# Patient Record
Sex: MALE | Race: WHITE | NOT HISPANIC OR LATINO | Employment: FULL TIME | URBAN - METROPOLITAN AREA
[De-identification: names, ages, dates, MRNs, and addresses within clinical notes are randomized per-mention and may not be internally consistent; named-entity substitution may affect disease eponyms.]

---

## 2023-05-08 LAB
CHOLESTEROL (MG/DL) IN SER/PLAS: 218 MG/DL (ref 0–199)
CHOLESTEROL IN HDL (MG/DL) IN SER/PLAS: 65.8 MG/DL
CHOLESTEROL/HDL RATIO: 3.3
ESTIMATED AVERAGE GLUCOSE FOR HBA1C: 108 MG/DL
HEMOGLOBIN A1C/HEMOGLOBIN TOTAL IN BLOOD: 5.4 %
LDL: 130 MG/DL (ref 0–99)
THYROTROPIN (MIU/L) IN SER/PLAS BY DETECTION LIMIT <= 0.05 MIU/L: 7.92 MIU/L (ref 0.44–3.98)
THYROXINE (T4) FREE (NG/DL) IN SER/PLAS: 1.25 NG/DL (ref 0.78–1.48)
TRIGLYCERIDE (MG/DL) IN SER/PLAS: 113 MG/DL (ref 0–149)
VLDL: 23 MG/DL (ref 0–40)

## 2023-12-13 DIAGNOSIS — C43.9 MALIGNANT MELANOMA, UNSPECIFIED SITE (MULTI): Primary | ICD-10-CM

## 2023-12-14 DIAGNOSIS — C79.89 SECONDARY MALIGNANT NEOPLASM OF OTHER SPECIFIED SITES (MULTI): Primary | ICD-10-CM

## 2023-12-15 ENCOUNTER — HOSPITAL ENCOUNTER (OUTPATIENT)
Dept: RADIOLOGY | Facility: HOSPITAL | Age: 58
Discharge: HOME | End: 2023-12-15
Payer: COMMERCIAL

## 2023-12-15 ENCOUNTER — APPOINTMENT (OUTPATIENT)
Dept: RADIOLOGY | Facility: HOSPITAL | Age: 58
End: 2023-12-15
Payer: COMMERCIAL

## 2023-12-15 ENCOUNTER — HOSPITAL ENCOUNTER (OUTPATIENT)
Dept: RADIOLOGY | Facility: HOSPITAL | Age: 58
End: 2023-12-15
Payer: COMMERCIAL

## 2023-12-15 DIAGNOSIS — C43.9 MALIGNANT MELANOMA, UNSPECIFIED SITE (MULTI): ICD-10-CM

## 2023-12-15 DIAGNOSIS — C79.89 SECONDARY MALIGNANT NEOPLASM OF OTHER SPECIFIED SITES (MULTI): ICD-10-CM

## 2023-12-15 PROCEDURE — 70491 CT SOFT TISSUE NECK W/DYE: CPT | Performed by: RADIOLOGY

## 2023-12-15 PROCEDURE — 2550000001 HC RX 255 CONTRASTS: Performed by: NURSE PRACTITIONER

## 2023-12-15 PROCEDURE — 71260 CT THORAX DX C+: CPT | Performed by: RADIOLOGY

## 2023-12-15 PROCEDURE — 70491 CT SOFT TISSUE NECK W/DYE: CPT

## 2023-12-15 PROCEDURE — 74177 CT ABD & PELVIS W/CONTRAST: CPT | Performed by: RADIOLOGY

## 2023-12-15 PROCEDURE — 71260 CT THORAX DX C+: CPT

## 2023-12-15 RX ADMIN — IOHEXOL 120 ML: 350 INJECTION, SOLUTION INTRAVENOUS at 11:57

## 2023-12-19 ENCOUNTER — APPOINTMENT (OUTPATIENT)
Dept: HEMATOLOGY/ONCOLOGY | Facility: HOSPITAL | Age: 58
End: 2023-12-19
Payer: COMMERCIAL

## 2023-12-21 ASSESSMENT — DERMATOLOGY QUALITY OF LIFE (QOL) ASSESSMENT
RATE HOW EMOTIONALLY BOTHERED YOU ARE BY YOUR SKIN PROBLEM (FOR EXAMPLE, WORRY, EMBARRASSMENT, FRUSTRATION): 0 - NEVER BOTHERED
RATE HOW BOTHERED YOU ARE BY SYMPTOMS OF YOUR SKIN PROBLEM (EG, ITCHING, STINGING BURNING, HURTING OR SKIN IRRITATION): 1
WHAT SINGLE SKIN CONDITION LISTED BELOW IS THE PATIENT ANSWERING THE QUALITY-OF-LIFE ASSESSMENT QUESTIONS ABOUT: NONE OF THE ABOVE
RATE HOW BOTHERED YOU ARE BY EFFECTS OF YOUR SKIN PROBLEMS ON YOUR ACTIVITIES (EG, GOING OUT, ACCOMPLISHING WHAT YOU WANT, WORK ACTIVITIES OR YOUR RELATIONSHIPS WITH OTHERS): 0 - NEVER BOTHERED
RATE HOW BOTHERED YOU ARE BY EFFECTS OF YOUR SKIN PROBLEMS ON YOUR ACTIVITIES (EG, GOING OUT, ACCOMPLISHING WHAT YOU WANT, WORK ACTIVITIES OR YOUR RELATIONSHIPS WITH OTHERS): 0 - NEVER BOTHERED
RATE HOW BOTHERED YOU ARE BY SYMPTOMS OF YOUR SKIN PROBLEM (EG, ITCHING, STINGING BURNING, HURTING OR SKIN IRRITATION): 1
RATE HOW EMOTIONALLY BOTHERED YOU ARE BY YOUR SKIN PROBLEM (FOR EXAMPLE, WORRY, EMBARRASSMENT, FRUSTRATION): 0 - NEVER BOTHERED
WHAT SINGLE SKIN CONDITION LISTED BELOW IS THE PATIENT ANSWERING THE QUALITY-OF-LIFE ASSESSMENT QUESTIONS ABOUT: NONE OF THE ABOVE

## 2023-12-21 ASSESSMENT — PATIENT GLOBAL ASSESSMENT (PGA): WHAT IS THE PGA: PATIENT GLOBAL ASSESSMENT:  1 - CLEAR

## 2023-12-22 ENCOUNTER — OFFICE VISIT (OUTPATIENT)
Dept: DERMATOLOGY | Facility: CLINIC | Age: 58
End: 2023-12-22
Payer: COMMERCIAL

## 2023-12-22 DIAGNOSIS — D22.9 MULTIPLE BENIGN NEVI: ICD-10-CM

## 2023-12-22 DIAGNOSIS — L90.5 SCAR CONDITIONS AND FIBROSIS OF SKIN: ICD-10-CM

## 2023-12-22 DIAGNOSIS — L81.4 LENTIGO: Primary | ICD-10-CM

## 2023-12-22 DIAGNOSIS — L85.3 XEROSIS CUTIS: ICD-10-CM

## 2023-12-22 DIAGNOSIS — Z85.820 PERSONAL HISTORY OF MALIGNANT MELANOMA: ICD-10-CM

## 2023-12-22 PROCEDURE — 1036F TOBACCO NON-USER: CPT | Performed by: DERMATOLOGY

## 2023-12-22 PROCEDURE — 99213 OFFICE O/P EST LOW 20 MIN: CPT | Performed by: DERMATOLOGY

## 2023-12-22 RX ORDER — LEVOTHYROXINE SODIUM 100 UG/1
100 TABLET ORAL DAILY
COMMUNITY
Start: 2023-02-26 | End: 2024-03-19 | Stop reason: WASHOUT

## 2023-12-22 NOTE — PROGRESS NOTES
Subjective     Errol Das is a 58 y.o. male who presents for the following: Skin Check. No new lesions of concern. Intermittent sunscreen use. Some itchy skin on his legs and abdomen. He had a bcc diagnosed at his last visit, s/p Mohs. 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    Review of Systems:  No other skin or systemic complaints other than what is documented elsewhere in the note.    The following portions of the chart were reviewed this encounter and updated as appropriate:         Skin Cancer History  - 04/2023, right neck, infiltrative and nodular BCC, s/p Mohs with Dr. 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 Year  Location: Right parotid cheek. Treatment(s): pembrolizumab Pathology: [id number]  - reports melanoma in situ, s/p excision with Dr. Poe, 15+ years ago      Specialty Problems    None       Objective   Well appearing patient in no apparent distress; mood and affect are within normal limits.    A full examination was performed including scalp, head, eyes, ears, nose, lips, neck, chest, axillae, abdomen, back, buttocks, bilateral upper extremities, bilateral lower extremities, hands, feet, fingers, toes, fingernails, and toenails. All findings within normal limits unless otherwise noted below.    Assessment/Plan   1. Lentigo  Scattered tan macules in sun-exposed areas.    Lentiginies  - discussed benign nature of lesions, and that none meet the criteria for biopsy today      2. Personal history of malignant melanoma  No cervical, axillary, or inguinal lymphadenopathy.    - discussed ABCDEs of melanoma, and that nonmelanoma skin cancers can present as nonhealing wounds  - discussed sun protection, recommended SPF 30 daily  - continue q6mo skin checks  - parotid melanoma being followed by heme onc    Related Procedures  Follow Up In Dermatology - Established Patient    3. Multiple benign nevi  Multiple brown macules and papules with  regular boarders and pigment network    4. Xerosis cutis  Legs  Fine, ashy, pale to white scale diffusely over skin.    Xerosis  - recommended moisturization BID    5. Scar conditions and fibrosis of skin  Neck - Anterior  Well healed surgical scars with no evidence of recurrence    No evidence of recurrence, continue skin checks.    Seen and discussed with attending physician Dr. Gerson Blackman MD, PhD  Resident, Dermatology    I saw and evaluated the patient. I personally obtained the key and critical portions of the history and physical exam or was physically present for key and critical portions performed by the resident/fellow. I reviewed the resident/fellow's documentation and discussed the patient with the resident/fellow. I agree with the resident/fellow's medical decision making as documented in the note.    Justino Nieto MD PhD

## 2023-12-26 ENCOUNTER — OFFICE VISIT (OUTPATIENT)
Dept: HEMATOLOGY/ONCOLOGY | Facility: HOSPITAL | Age: 58
End: 2023-12-26
Payer: COMMERCIAL

## 2023-12-26 ENCOUNTER — LAB (OUTPATIENT)
Dept: LAB | Facility: HOSPITAL | Age: 58
End: 2023-12-26
Payer: COMMERCIAL

## 2023-12-26 VITALS
TEMPERATURE: 97.9 F | OXYGEN SATURATION: 98 % | HEART RATE: 83 BPM | SYSTOLIC BLOOD PRESSURE: 108 MMHG | RESPIRATION RATE: 16 BRPM | BODY MASS INDEX: 25.19 KG/M2 | HEIGHT: 68 IN | DIASTOLIC BLOOD PRESSURE: 67 MMHG | WEIGHT: 166.23 LBS

## 2023-12-26 DIAGNOSIS — C79.89: Primary | ICD-10-CM

## 2023-12-26 DIAGNOSIS — C79.89: ICD-10-CM

## 2023-12-26 DIAGNOSIS — E03.9 HYPOTHYROIDISM, UNSPECIFIED TYPE: ICD-10-CM

## 2023-12-26 DIAGNOSIS — Z92.89 HISTORY OF IMMUNOTHERAPY: ICD-10-CM

## 2023-12-26 DIAGNOSIS — C79.89 SECONDARY MALIGNANT NEOPLASM OF OTHER SPECIFIED SITES (MULTI): ICD-10-CM

## 2023-12-26 PROBLEM — C43.9 MELANOMA OF SKIN (MULTI): Status: ACTIVE | Noted: 2023-12-26

## 2023-12-26 LAB
ALBUMIN SERPL BCP-MCNC: 4.7 G/DL (ref 3.4–5)
ALP SERPL-CCNC: 66 U/L (ref 33–120)
ALT SERPL W P-5'-P-CCNC: 10 U/L (ref 10–52)
ANION GAP SERPL CALC-SCNC: 14 MMOL/L (ref 10–20)
AST SERPL W P-5'-P-CCNC: 20 U/L (ref 9–39)
BASOPHILS # BLD AUTO: 0.1 X10*3/UL (ref 0–0.1)
BASOPHILS NFR BLD AUTO: 1 %
BILIRUB SERPL-MCNC: 0.5 MG/DL (ref 0–1.2)
BUN SERPL-MCNC: 18 MG/DL (ref 6–23)
CALCIUM SERPL-MCNC: 9.7 MG/DL (ref 8.6–10.3)
CHLORIDE SERPL-SCNC: 102 MMOL/L (ref 98–107)
CO2 SERPL-SCNC: 28 MMOL/L (ref 21–32)
CREAT SERPL-MCNC: 0.83 MG/DL (ref 0.5–1.3)
EOSINOPHIL # BLD AUTO: 0.76 X10*3/UL (ref 0–0.7)
EOSINOPHIL NFR BLD AUTO: 8 %
ERYTHROCYTE [DISTWIDTH] IN BLOOD BY AUTOMATED COUNT: 13 % (ref 11.5–14.5)
GFR SERPL CREATININE-BSD FRML MDRD: >90 ML/MIN/1.73M*2
GLUCOSE SERPL-MCNC: 98 MG/DL (ref 74–99)
HCT VFR BLD AUTO: 40.2 % (ref 41–52)
HGB BLD-MCNC: 13.7 G/DL (ref 13.5–17.5)
IMM GRANULOCYTES # BLD AUTO: 0.02 X10*3/UL (ref 0–0.7)
IMM GRANULOCYTES NFR BLD AUTO: 0.2 % (ref 0–0.9)
LDH SERPL L TO P-CCNC: 173 U/L (ref 84–246)
LYMPHOCYTES # BLD AUTO: 1.66 X10*3/UL (ref 1.2–4.8)
LYMPHOCYTES NFR BLD AUTO: 17.4 %
MCH RBC QN AUTO: 32.2 PG (ref 26–34)
MCHC RBC AUTO-ENTMCNC: 34.1 G/DL (ref 32–36)
MCV RBC AUTO: 94 FL (ref 80–100)
MONOCYTES # BLD AUTO: 0.6 X10*3/UL (ref 0.1–1)
MONOCYTES NFR BLD AUTO: 6.3 %
NEUTROPHILS # BLD AUTO: 6.39 X10*3/UL (ref 1.2–7.7)
NEUTROPHILS NFR BLD AUTO: 67.1 %
NRBC BLD-RTO: 0 /100 WBCS (ref 0–0)
PLATELET # BLD AUTO: 276 X10*3/UL (ref 150–450)
POTASSIUM SERPL-SCNC: 4.6 MMOL/L (ref 3.5–5.3)
PROT SERPL-MCNC: 7.6 G/DL (ref 6.4–8.2)
RBC # BLD AUTO: 4.26 X10*6/UL (ref 4.5–5.9)
SODIUM SERPL-SCNC: 139 MMOL/L (ref 136–145)
TSH SERPL-ACNC: 3.71 MIU/L (ref 0.44–3.98)
WBC # BLD AUTO: 9.5 X10*3/UL (ref 4.4–11.3)

## 2023-12-26 PROCEDURE — 99215 OFFICE O/P EST HI 40 MIN: CPT | Performed by: NURSE PRACTITIONER

## 2023-12-26 PROCEDURE — 85025 COMPLETE CBC W/AUTO DIFF WBC: CPT

## 2023-12-26 PROCEDURE — 1036F TOBACCO NON-USER: CPT | Performed by: NURSE PRACTITIONER

## 2023-12-26 PROCEDURE — 84443 ASSAY THYROID STIM HORMONE: CPT

## 2023-12-26 PROCEDURE — 80053 COMPREHEN METABOLIC PANEL: CPT

## 2023-12-26 PROCEDURE — 83615 LACTATE (LD) (LDH) ENZYME: CPT

## 2023-12-26 PROCEDURE — 36415 COLL VENOUS BLD VENIPUNCTURE: CPT

## 2023-12-26 ASSESSMENT — ENCOUNTER SYMPTOMS
HEMATOLOGIC/LYMPHATIC NEGATIVE: 1
ENDOCRINE NEGATIVE: 1
CARDIOVASCULAR NEGATIVE: 1
RESPIRATORY NEGATIVE: 1
GASTROINTESTINAL NEGATIVE: 1
PSYCHIATRIC NEGATIVE: 1
EYES NEGATIVE: 1
CONSTITUTIONAL NEGATIVE: 1
MUSCULOSKELETAL NEGATIVE: 1
NEUROLOGICAL NEGATIVE: 1

## 2023-12-26 ASSESSMENT — PAIN SCALES - GENERAL: PAINLEVEL: 0-NO PAIN

## 2023-12-26 NOTE — PATIENT INSTRUCTIONS
Mr. Errol Das ,  It was a pleasure talking to you today.    As discussed, we will be conducting surveillance scans in 3 months on 03/15/2024. We will meet again after on 03/19/2024. Please keep your appointments with dermatology and surgical oncology.     Please make sure to make your appointments as we discussed. Your appointments should also be visible in your MyChart.     In case of an emergency please dial 911 or report to your nearest Emergency Room.  For all other questions, please do not hesitate to reach out to us at the number listed below.    Thank you for choosing [facility] at [facility] [place].   We appreciate your visit.     MD Alisson Clifford, ARIANE Matthew RN ([facility] location)  Ju Schmidt RN ([place] location)    Sarcoma and Cutaneous Oncology team    Phone: [phone]  Fax: [phone].

## 2023-12-26 NOTE — PROGRESS NOTES
".Patient ID: Errol Das is a 58 y.o. male.  Referring Physician: No referring provider defined for this encounter.  Primary Care Provider: Keith Matos MD MPH     Chief Complaint   Patient presents with    Follow-up      HPI  Mr. Das is referred to our clinic by Dr. 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 He met with our team on 5/31/22.      Mr. Das met with Dr. Bhat on 4/15/22. He was referred by his PCP after the patient told them about a lump in the parotid gland region since beginning of 2022. the lump had grown significantly. He underwent FNAC on 4/28/22 and the path read  melanoma. Then he was taken to OR on 5/23/22 and underwent resection. Pathology report reads 1/16 positive nodes. The lone node has 6.5mm deposit with MELANIE. The parotid mass is necrotic and measures 2.3cm. Negative for BRAF V600.     PET scan (on 7/11/22) and MRI brain (7/14/22) did not show any activity concerning for tumors. We recommend adjuvant pembrolizumab. Patient consented on 7/19/22 and started Keytruda 400mg every 6 weeks on 7/26/22. He completed one year of therapy on 08/01/2023.    Treatment History:    Systemic treatment  1. Pembrolizumab 400mg every 6 weeks- s/p 9 cycles as of 08/01/2023     irAE's  1. Hypothyroidism (currently on Synthroid 112mcg PO daily).     Subjective   Please refer to \"Notes/Cancer History\" above for complete History of present illness.     Mr Errol Das     - Presents to clinic alone.   - Nasal congestion. No fevers, chills, cough or SOB.   - Otherwise feels well.  - Continues skin checks with dermatology.      Review of Systems:   Review of Systems   Constitutional: Negative.    HENT:  Negative.          Nasal congestion    Eyes: Negative.    Respiratory: Negative.     Cardiovascular: Negative.    Gastrointestinal: Negative.    Endocrine: Negative.    Genitourinary: Negative.     Musculoskeletal: Negative.    Skin: Negative.  " "  Neurological: Negative.    Hematological: Negative.    Psychiatric/Behavioral: Negative.           MEDICAL HISTORY  Melanoma, hypothyroidism     FAMILY HISTORY  No family history on file.    TOBACCO HISTORY  Tobacco Use: Low Risk  (12/26/2023)    Patient History     Smoking Tobacco Use: Never     Smokeless Tobacco Use: Never     Passive Exposure: Not on file       SOCIAL HISTORY  Social Connections: Not on file   , lives with his wife. Has a son and daughter. Works full time.      Outpatient Medication Profile:  Current Outpatient Medications on File Prior to Visit   Medication Sig Dispense Refill    levothyroxine (Synthroid, Levoxyl) 100 mcg tablet Take 1 tablet (100 mcg) by mouth once daily.       No current facility-administered medications on file prior to visit.         Performance Status:  Asymptomatic     Vitals and Measurements:   /67 (BP Location: Left arm, Patient Position: Sitting, BP Cuff Size: Adult)   Pulse 83   Temp 36.6 °C (97.9 °F) (Temporal)   Resp 16   Ht (S) 1.716 m (5' 7.56\")   Wt 75.4 kg (166 lb 3.6 oz)   SpO2 98%   BMI 25.61 kg/m²       Physical Exam:   Physical Exam  Constitutional:       Appearance: Normal appearance.   HENT:      Head: Normocephalic and atraumatic.      Nose: Nose normal.      Mouth/Throat:      Mouth: Mucous membranes are moist.      Pharynx: Oropharynx is clear.   Eyes:      Conjunctiva/sclera: Conjunctivae normal.   Neck:      Comments: Healed incisions without nodules   Cardiovascular:      Rate and Rhythm: Normal rate and regular rhythm.      Pulses: Normal pulses.      Heart sounds: Normal heart sounds.   Pulmonary:      Effort: Pulmonary effort is normal.      Breath sounds: Normal breath sounds.   Abdominal:      General: Bowel sounds are normal.      Palpations: Abdomen is soft.   Musculoskeletal:         General: Normal range of motion.      Cervical back: Neck supple.   Skin:     General: Skin is warm and dry.   Neurological:      General: " No focal deficit present.      Mental Status: He is alert and oriented to person, place, and time.   Psychiatric:         Mood and Affect: Mood normal.         Behavior: Behavior normal.        Lab Results:  I have reviewed these laboratory results:     No visits with results within 1 Month(s) from this visit.   Latest known visit with results is:   Legacy Encounter on 09/21/2023   Component Date Value Ref Range Status    Cortisol 09/21/2023 12.8  2.5 - 20.0 ug/dL Final    LD 09/21/2023 144  84 - 246 U/L Final    Glucose 09/21/2023 99  74 - 99 mg/dL Final    Sodium 09/21/2023 138  136 - 145 mmol/L Final    Potassium 09/21/2023 4.5  3.5 - 5.3 mmol/L Final    Chloride 09/21/2023 100  98 - 107 mmol/L Final    Bicarbonate 09/21/2023 29  21 - 32 mmol/L Final    Anion Gap 09/21/2023 14  10 - 20 mmol/L Final    Urea Nitrogen 09/21/2023 15  6 - 23 mg/dL Final    Creatinine 09/21/2023 0.71  0.50 - 1.30 mg/dL Final    GFR MALE 09/21/2023 >90  >90 mL/min/1.73m2 Final    Calcium 09/21/2023 9.5  8.6 - 10.3 mg/dL Final    Albumin 09/21/2023 4.6  3.4 - 5.0 g/dL Final    Alkaline Phosphatase 09/21/2023 64  33 - 120 U/L Final    Total Protein 09/21/2023 7.4  6.4 - 8.2 g/dL Final    AST 09/21/2023 27  9 - 39 U/L Final    Total Bilirubin 09/21/2023 0.4  0.0 - 1.2 mg/dL Final    ALT (SGPT) 09/21/2023 15  10 - 52 U/L Final    WBC 09/21/2023 6.2  4.4 - 11.3 x10E9/L Final    RBC 09/21/2023 4.43 (L)  4.50 - 5.90 x10E12/L Final    Hemoglobin 09/21/2023 14.2  13.5 - 17.5 g/dL Final    Hematocrit 09/21/2023 41.9  41.0 - 52.0 % Final    MCV 09/21/2023 95  80 - 100 fL Final    MCHC 09/21/2023 33.9  32.0 - 36.0 g/dL Final    Platelets 09/21/2023 242  150 - 450 x10E9/L Final    RDW 09/21/2023 13.1  11.5 - 14.5 % Final    Neutrophils % 09/21/2023 57.2  40.0 - 80.0 % Final    Immature Granulocytes %, Automated 09/21/2023 0.3  0.0 - 0.9 % Final    Lymphocytes % 09/21/2023 24.7  13.0 - 44.0 % Final    Monocytes % 09/21/2023 7.0  2.0 - 10.0 % Final     Eosinophils % 09/21/2023 9.3  0.0 - 6.0 % Final    Basophils % 09/21/2023 1.5  0.0 - 2.0 % Final    Neutrophils Absolute 09/21/2023 3.53  1.20 - 7.70 x10E9/L Final    Lymphocytes Absolute 09/21/2023 1.52  1.20 - 4.80 x10E9/L Final    Monocytes Absolute 09/21/2023 0.43  0.10 - 1.00 x10E9/L Final    Eosinophils Absolute 09/21/2023 0.57  0.00 - 0.70 x10E9/L Final    Basophils Absolute 09/21/2023 0.09  0.00 - 0.10 x10E9/L Final    Adrenocorticotropic Hormone (ACTH) 09/21/2023 29.2  7.2 - 63.3 pg/mL Final    TSH 09/21/2023 4.96 (H)  0.44 - 3.98 mIU/L Final    Free T4 09/21/2023 1.07  0.78 - 1.48 ng/dL Final         Radiology Result:  I have reviewed the latest Imaging in PACS and the findings are noted in this note. I discussed the results of the latest imaging with the patient. All previous imaging were reviewed at the time it was completed. Full records are available in the EMR for review as well.     CT chest abdomen pelvis w IV contrast    Result Date: 12/15/2023  Impression: Melanoma restaging scan as compared to prior CT from 09/22/2023:   1. Stable appearance of smooth subcutaneous soft tissue thickening along the right lower abdominal wall. No evidence of new metastatic disease within the chest, abdomen, or pelvis. 2. Stable additional chronic and incidental findings as described above.     I personally reviewed the images/study and I agree with Dr. Urmila Lindsay and the findings as stated.   MACRO: None   Signed by: Naresh Maldonado 12/15/2023 10:32 PM Dictation workstation:   [id number]    CT soft tissue neck w IV contrast    Result Date: 12/15/2023  Impression: No evidence of significant cervical adenopathy or a soft tissue mass in the neck.   Postsurgical changes of the right parotid gland, unchanged in appearance compared to the prior exam 09/22/2023.   Unchanged asymmetric air within the left fossa of Rosenmuller.   MACRO: None   Signed by: Alisson Snowden 12/15/2023 12:25 PM Dictation workstation:    "[id number]        Pathology Results:  I have reviewed the full pathology report recorded in the EMR. The pertinent portions indicating diagnosis are listed here in the note. for details please refer to the full report recorded in the EMR.    Surgical Pathology [Mohan 10 2022 2:15PM] ([id number])    Specimens: RIGHT SUPERFICIAL PAROTIDECTOMY OVER LINE FACIAL SKIN. LONG STITCH  SUPERIOR     Name MATTIE MCCULLOUGH     Accession #: [id number]   Pathologist: TYSON LARKIN MD, Board Certified   Dermatopathologist   Date of Procedure: 5/23/2022   Date Received: 5/23/2022   Date Reported 6/10/2022    Submitting Physician: JORGE POWELL MD   Location: [facility] Other External #     FINAL DIAGNOSIS   A. SPECIMEN DESIGNATED \"RIGHT SUPERFICIAL PAROTIDECTOMY OVER LINE FACIAL   SKIN\":   --METASTATIC MELANOMA INVOLVING AT LEAST ONE  OF SIXTEEN INTRAPAROTID LYMPH NODES WITH EXTRACAPSULAR EXTENSION (1/16), SEE COMMENT   --SKIN WITH AN ATYPICAL JUNCTIONAL MELANOCYTIC PROLIFERATION, LOCALIZED AREA OF DERMAL MELANOSIS AND BIOPSY SITE CHANGES, SEE COMMENT     COMMENT: Sections  show actinically altered skin with increased single mild to moderately enlarged junctional melanocytes with poor circumscription, focal areas of confluence and involvement of superficial follicular epithelium, present at peripheral margins. There is a  localized collection of dermal and subcutaneous melanophages approximately 6.5 mm in depth, involving the superior margin. There is a predominantly necrotic intraparotid mass (2.3 cm) with focal collections of severely atypical melanocytes and extensive  melanosis, present at the superficial margin and extending < 1 mm from the deep and anterior margins. Melanocytes are highlighted with SOX-10 and Melan-A. Intraparotid melanocytes and isolated junctional melanocytes are PRAME positive. (Controlsstain  adequately.)     The skin findings are concerning for regressed invasive melanoma, lentigo maligna type " combined with actinic melanocytosis of sun-damaged skin and reactive melanocytic hyperplasia from the trauma of the previous procedure. The  intraparotid mass may represent multiple matted positive lymph nodes, the exact number of which cannot be determined and is therefore counted as one positive lymph node with extracapsular extension. Results of BRAF testing will be reported separately.     If clinically appropriate, further reexcision of the surgical site is recommended.     : Dr. Jason Mtathew     The gross and/or microscopic findings were reviewed in conjunction with pathology resident, Kassie Panda MD.      Electronically Signed Out By TYSON LARKIN MD, Board Certified Dermatopathologist/SWATI   By the signature on this report, the individual or group listed as making the Final Interpretation/Diagnosis certifies that they have reviewed this case. Diagnostic  interpretation performed at [facility] [address] [facility] [address]      =============================================  Addendum Diagnosis   TEST: BRAF Exon 15 Sequencing   SPECIMEN: FFPE, Mass with anterior, superficial, deep margin, Excision,   [id number] A14   DISEASE  DIAGNOSIS: Metastatic Melanoma   Estimated Tumor Content: 40%   COLLECTION DATE: 05/23/2022   RECEIVED DATE: 06/13/2022   REPORT DATE: 06/17/2022     RESULT: None      DISEASE RELEVANT ALTERATIONS NOT DETECTED:   Negative for BRAF V600 mutations.      Assessment and Plan:   Assessment/Plan   Mr. Errol Das is a 58 y.o. 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 Completed one year of adjuvant Pembrolizumab 08/01/2023.     # Melanoma  - CT 12/15/2023 showing stable changes.   - RTC 03/19/2024.  - Labs and CT scan prior 03/15/2024.  - Continue f/u with dermatology for skin checks. Next visit with Dr. Nieto 06/21/2024.     # Hypothyroidism   - TSH pending today.    - Continue levothyroxine.      # Area of thickening on CT scan  - Visually inspected the area. The CT scan is corresponding to the scar he has in his groin where the fat was taken from for the reconstructive surgery.      DISCLAIMER:   In preparing for this visit and writing this note, I reviewed all the previous electronic medical records (labs, imaging and medical charts) of the patient available in the physician portal. Significant findings which helped in decision making are recorded  in this chart.     The plan was discussed with the patient. We gave him ample opportunities to ask questions. All questions were answered to his satisfaction and he verbalized understanding.      INSTRUCTIONS FOR PATIENT  Mr. Errol Das ,  It was a pleasure talking to you today.    As discussed, we will be conducting surveillance scans in 3 months on 03/15/2024. We will meet again on 03/19/2024. Please keep your appointments with dermatology and surgical oncology.     Please make sure to schedule your appointments as we discussed. Your appointments should also appear in your MyChart.   In case of an emergency please dial 911 or report to your nearest Emergency Room.  For all other questions, please do not hesitate to reach out to us at the number listed below.    Thank you for choosing [facility] at [facility].   We appreciate your visit.     MD Alisson Clifford APRN Taylor Costello, RN ([facility] location)  Ju Schmidt RN ([place] location)    Sarcoma and Cutaneous Oncology team    Phone: [phone]  Fax: [phone].

## 2024-01-22 ENCOUNTER — PATIENT MESSAGE (OUTPATIENT)
Dept: ORTHOPEDIC SURGERY | Facility: CLINIC | Age: 59
End: 2024-01-22
Payer: COMMERCIAL

## 2024-01-23 ENCOUNTER — APPOINTMENT (OUTPATIENT)
Dept: PRIMARY CARE | Facility: HOSPITAL | Age: 59
End: 2024-01-23
Payer: COMMERCIAL

## 2024-01-23 ENCOUNTER — OFFICE VISIT (OUTPATIENT)
Dept: ORTHOPEDIC SURGERY | Facility: HOSPITAL | Age: 59
End: 2024-01-23
Payer: COMMERCIAL

## 2024-01-23 ENCOUNTER — HOSPITAL ENCOUNTER (OUTPATIENT)
Dept: RADIOLOGY | Facility: HOSPITAL | Age: 59
Discharge: HOME | End: 2024-01-23
Payer: COMMERCIAL

## 2024-01-23 DIAGNOSIS — M25.561 RIGHT KNEE PAIN, UNSPECIFIED CHRONICITY: ICD-10-CM

## 2024-01-23 PROCEDURE — 99214 OFFICE O/P EST MOD 30 MIN: CPT

## 2024-01-23 PROCEDURE — 73560 X-RAY EXAM OF KNEE 1 OR 2: CPT | Mod: RIGHT SIDE | Performed by: RADIOLOGY

## 2024-01-23 PROCEDURE — 1036F TOBACCO NON-USER: CPT

## 2024-01-23 PROCEDURE — 73560 X-RAY EXAM OF KNEE 1 OR 2: CPT | Mod: RT

## 2024-01-23 PROCEDURE — 99204 OFFICE O/P NEW MOD 45 MIN: CPT

## 2024-01-23 ASSESSMENT — PAIN - FUNCTIONAL ASSESSMENT: PAIN_FUNCTIONAL_ASSESSMENT: 0-10

## 2024-01-23 ASSESSMENT — PAIN SCALES - GENERAL: PAINLEVEL_OUTOF10: 0 - NO PAIN

## 2024-01-23 NOTE — PROGRESS NOTES
Subjective    Patient ID: Errol Das is a 59 y.o. male.    Chief Complaint: New Patient Visit of the Right Knee     Last Surgery: No surgery found  Last Surgery Date: No surgery found    HPI    Errol Das is a 59 y.o.s male presenting for evaluation of side: right knee pain for the past 1 month. Sx started with running and bending the right knee to stretch  and localized to right  superior knee at insertion to the quad tendon . Described as mild soreness. Since increased with running. States that it does not bother him from day to day, only when stretching and running. Patient has not tried medication not used. has not had similar sx in the past. has not tried therapy for the pain. Denies trauma/fall. States that it could have been caused by a ski trip that he had last month. Denies numbness, tingling, f/c, n/v, CP, SOB, or any other complaints/concerns.      No past medical history on file.    Medication Documentation Review Audit       Reviewed by Olena Ramachandran MA (Medical Assistant) on 01/23/24 at 1158      Medication Order Taking? Sig Documenting Provider Last Dose Status   levothyroxine (Synthroid, Levoxyl) 100 mcg tablet [id number] Yes Take 1 tablet (100 mcg) by mouth once daily. Historical Provider, MD Taking Active                    No Known Allergies    Social History     Socioeconomic History    Marital status:      Spouse name: Not on file    Number of children: Not on file    Years of education: Not on file    Highest education level: Not on file   Occupational History    Not on file   Tobacco Use    Smoking status: Never    Smokeless tobacco: Never   Substance and Sexual Activity    Alcohol use: Never    Drug use: Never    Sexual activity: Not on file   Other Topics Concern    Not on file   Social History Narrative    Not on file     Social Determinants of Health     Financial Resource Strain: Not on file   Food Insecurity: Not on file   Transportation Needs: Not on file    Physical Activity: Not on file   Stress: Not on file   Social Connections: Not on file   Intimate Partner Violence: Not on file   Housing Stability: Not on file       Past Surgical History:   Procedure Laterality Date    OTHER SURGICAL HISTORY  10/28/2013    Corneal LASIK Bilateral    OTHER SURGICAL HISTORY  10/28/2013    Finger Tendon Lengthening    TONSILLECTOMY  10/28/2013    Tonsillectomy        Objective   Review of Systems   30 point ROS reviewed and negative other than as listed in the HPI.      Exam  Gen: The pt is A&Ox3, NAD, and appear state age and weight  Psychiatric: mood and affect are appropriate   Eyes: sclera are white, EOM grossly intact  ENT: MMM  Neck: supple, thyroid is midline  Respiratory: respirations are nonlabored, chest rise symmetric  CV: rate is regular by palpation of distal pulses  Abdomen: nondistended   Integument: no obvious cutaneous lesions noted. No signs of lymphangitis. No signs of systemic edema.     MSK: Right knee  skin intact, no wounds or breakdown noted  No lower leg edema  No TTP joint line   no knee effusion noted  compartments soft  no calf tenderness  sensation intact to light touch  motor intact including TA/GS/EHL  palpable DP/PT pulses 2+  stable to valgus/varus stress exams at 30 degrees of flexion  negative Lachmans and posterior drawer  Negative patellar crepitus  Noticeable bulge around the superior part of the knee where quad tendon attachment occurs  No tenderness to palpation  Was able to perform straight leg raise and had 5/5 quadriceps strength  Knee motion- 0-130    Imaging:  I personally reviewed multiple views of the right knee were obtained in the office today demonstrate a stable knee joint with no arthritis present. Blunting of the quadriceps tendon soft tissue shadow was noted.      Assessment/Plan   Encounter Diagnoses:  Right knee pain, unspecified chronicity    Assessment:  right  knee pain    Plan:  Patient is presenting for new right knee  pain. Patient states that about a month ago after a skiing trip that he started have right superior knee pain. It only occurs when he is flexing the knee and after running. He states that it is mild soreness. He states that he did not have any injury or trauma to the knee and did not hear a pop. Patient has not tried any treatment for the pain. Imaging showed a stable knee joint with no arthritis present. There was blunting of the quadriceps tendon soft tissue shadow noted. Patient had a noticeable bulge around the superior part of the knee where the quadriceps tendon attaches, but is able to straight leg raise and has full quadriceps strength. Given that the patient did not have any injury, is able to do day to day activity, and given he is able to perform straight leg raise, I believe that the patient more likely has a strain of his right quadriceps tendon vs. Partial tear of his quadriceps tendon. I explained to the patient what a strain of the quadriceps tendon is and how it is treated. Patient should begin physical therapy where they will work on ROM and quad strengthening. Patient should ice and take over the counter medication such as ibuprofen and tylenol for pain control and swelling. If the pain persists, worsens, or he starts to lose strength and motion, then he should follow up with our office and we will order an MRI to determine and evaluate the quadriceps tendon and look for a tear. If patient improves with physical therapy then no follow up is needed. Patient is in agreement with this plan.     Orders Placed This Encounter    XR knee right 1-2 views     No follow-ups on file.

## 2024-02-02 ENCOUNTER — EVALUATION (OUTPATIENT)
Dept: PHYSICAL THERAPY | Facility: HOSPITAL | Age: 59
End: 2024-02-02
Payer: COMMERCIAL

## 2024-02-02 DIAGNOSIS — M25.561 CHRONIC PAIN OF RIGHT KNEE: Primary | ICD-10-CM

## 2024-02-02 DIAGNOSIS — G89.29 CHRONIC PAIN OF RIGHT KNEE: Primary | ICD-10-CM

## 2024-02-02 PROCEDURE — 97161 PT EVAL LOW COMPLEX 20 MIN: CPT | Mod: GP

## 2024-02-02 PROCEDURE — 97110 THERAPEUTIC EXERCISES: CPT | Mod: GP

## 2024-02-02 ASSESSMENT — ENCOUNTER SYMPTOMS
OCCASIONAL FEELINGS OF UNSTEADINESS: 0
DEPRESSION: 0
LOSS OF SENSATION IN FEET: 0

## 2024-02-02 NOTE — PROGRESS NOTES
Initial evaluation  Physical Therapy Initial Evaluation    Patient Name:Errol Das  MRN:[id number]  Today's Date:2/6/2024  Referred by: * No referring provider recorded for this case *  Time Calculation  Start Time: 1113  Stop Time: 1157  Time Calculation (min): 44 min    Therapy Diagnosis  1. Chronic pain of right knee       Plan of Care  Planned interventions include prn: Cryotherapy, edema control, electrical stimulation, home exercise program, hot pack, manual therapy, therapeutic exercise, vasopneumatic device with cold  Frequency and duration: 1  time(s) a week, for  4 weeks or 4 visits .   Plan of care was developed with input and agreement by the patient.     Assessment    Problem List: activity limitations, ADLs/IADLs/self care skills, decreased functional level, decreased knowledge of HEP, flexibility, pain, range of motion/joint mobility and strength.     Patient is a 59 y.o. male who presents with complaint of R knee pain of about 6 weeks following ski trip. No previous history of knee pain . Standardized testing and measures administered today reveal that the patient has multiple impairments in body structures and functions, activity limitations, and participation restrictions. These include subjective and objective findings such as pain, tenderness to palpation of the affected area, decreased ROM, strength, flexibility, and function. The patient's impairments are likely influenced by mechanical dysfunction and deconditioning with possible overuse and degenerative changes. Skilled PT services are warranted in order to realize measurable and meaningful change in the above outcome measures and achieve improvements in the patient's functional status and individual goals.  Rehab Potential:good  Clinical Presentation: Stable and/or uncomplicated characteristics.   Evaluation Complexity: Low     Precautions/Fall Risk:none Pacemaker no  Seizures No  Post Op Movement/Restrictions No    Insurance  Visit  number: 1   Approved number of visits: 30  Onset Date: December 2023    Payor: [employer] [facility] / Plan: [employer] / Product Type: *No Product type* /     Subjective  Chief complaint/reason for visit: R knee pain following skiing but denies specific injury about 6 weeks ago. He had an x-ray 1 week ago with normal findings of bony structures but suspect partial quad tear. Denies past injuries.   Mechanism of Injury:  a sports injury skiing  Location of Pain: supralateral patella  Current Pain Level (0-10): 2   High Pain Level (0-10): 2   Low Pain Level (0-10): 0  Pain Quality: aching  Pain Exacerbating Factors: resting, lying down, sitting, standing, walking, kneeling, stairs and squatting  and running  Pain Relieving Factors:  activity modification    Medical Screening: Reviewed medical history form with patient and medical screening assessed.   Red Flags: Do you have any of the following? No  Fever/chills, unexplained weight changes, dizziness/fainting, unexplained change in bowel or bladder functions, unexplained malaise or muscle weakness, night pain/sweats, numbness or tingling  Current Medical Management: Orthopedic  Prior Level of Function (PLOF)  Patient previously independent with all ADLs  Exercise/Physical Activity: runner (has stopped) biking  Functional limitations: athletics, sitting , walking , participation in leisure activities, stairs , and sleeping  Work Status: full time job doing professor at [employer]  Current Status: remain unchanged slight improvement  Patient Awareness: Patient is aware of  his diagnosis and prognosis.   Living Environment: house with stairs  Social Support: spouse / significant other  Personal Factors That May Impact Care: none.  Patient's Goal for Treatment:  relieving pain , increasing strength , increasing mobility , walking with a normal gait , reducing symptoms , returning to work  and resuming athletics/activities .     ObjectiveOther Measures  Lower  "Extremity Funtional Score (LEFS): 76   Knee Objective  Observation/Posture: WNL  Palpation: minimal TTP distal lateral quad  Gait: WNL  R/L hamstring flexibility: 30 degrees bilaterally  R/L girth measurement: NT  R/L knee flexion AROM (degrees): 122 with pain//125  R/L knee extension AROM (degrees): lacking 5//lacking 5  R/L hip flexion AROM (degrees): 105//105  R/L hip extension AROM (degrees): 10//10  R/L knee flexion strength (MMT): 4+/5 //4+/5  R/L knee extension strength (MMT): 5/5 // 5/5    Special Tests: all WNL    Treatment Performed Today: Initial evaluation and patient education regarding diagnosis, prognosis, contributing factors, comorbidities, importance of HEP, role of PT, appropriate shoe wear, role of compression, and activity modification.    -TM walk: 5 minutes progressing to 3.8 mph  - Seated HS s: bilateral 30\"x3  - Prone quad s: R side 30\"x3   - Hip Bridging: 10\" x 20   Therapeutic Exercise   minutes      Response to Treatment: decreased pain, improved joint mobility/ROM, improved strength, improved flexibility, improved tissue mobility, improved posture, improved balance, improved gait and improved knowledge and understanding of condition    Education/Resources provided today: Home Program   [facility]: HS, calf, piriformis, and prone hip flexor stretching program  Lower Extremity Goals  Lower Extremity Goals: By discharge, patient will:  1) Demonstrate independence with home exercise program for overall symptom management  2) Increase overall exercise tolerance without adverse reaction or increased chief complaint for return to running.  3) Increase strength of R LE (all planes) to WNL in order to improve the ability to perform essential ADLs and return to PLOA.  4) Report decrease in pain by >= 2 points to meet MCID  5) Increase score of LEFS by > 9 points to meet the MCID    Patient stated goal: eliminate knee pain and return to running    "

## 2024-02-05 ENCOUNTER — APPOINTMENT (OUTPATIENT)
Dept: PHYSICAL THERAPY | Facility: HOSPITAL | Age: 59
End: 2024-02-05
Payer: COMMERCIAL

## 2024-02-12 ENCOUNTER — APPOINTMENT (OUTPATIENT)
Dept: PHYSICAL THERAPY | Facility: HOSPITAL | Age: 59
End: 2024-02-12
Payer: COMMERCIAL

## 2024-02-19 ENCOUNTER — APPOINTMENT (OUTPATIENT)
Dept: PHYSICAL THERAPY | Facility: HOSPITAL | Age: 59
End: 2024-02-19
Payer: COMMERCIAL

## 2024-03-04 ENCOUNTER — OFFICE VISIT (OUTPATIENT)
Dept: ORTHOPEDIC SURGERY | Facility: HOSPITAL | Age: 59
End: 2024-03-04
Payer: COMMERCIAL

## 2024-03-04 VITALS — WEIGHT: 165 LBS | BODY MASS INDEX: 25.01 KG/M2 | HEIGHT: 68 IN

## 2024-03-04 DIAGNOSIS — M25.461 EFFUSION OF RIGHT KNEE: Primary | ICD-10-CM

## 2024-03-04 DIAGNOSIS — M25.561 RIGHT KNEE PAIN, UNSPECIFIED CHRONICITY: ICD-10-CM

## 2024-03-04 DIAGNOSIS — M17.11 ARTHRITIS OF RIGHT KNEE: ICD-10-CM

## 2024-03-04 PROCEDURE — 99214 OFFICE O/P EST MOD 30 MIN: CPT | Performed by: FAMILY MEDICINE

## 2024-03-04 PROCEDURE — 99204 OFFICE O/P NEW MOD 45 MIN: CPT | Performed by: FAMILY MEDICINE

## 2024-03-04 PROCEDURE — 1036F TOBACCO NON-USER: CPT | Performed by: FAMILY MEDICINE

## 2024-03-04 ASSESSMENT — PAIN - FUNCTIONAL ASSESSMENT: PAIN_FUNCTIONAL_ASSESSMENT: 0-10

## 2024-03-04 ASSESSMENT — PAIN DESCRIPTION - DESCRIPTORS: DESCRIPTORS: DULL;ACHING

## 2024-03-04 ASSESSMENT — PAIN SCALES - GENERAL: PAINLEVEL_OUTOF10: 2

## 2024-03-04 NOTE — PROGRESS NOTES
** Please excuse any errors in grammar or translation related to this dictation. Voice recognition software was utilized to prepare this document. **    Assessment & Plan:  Clinical presentation most suggestive of flare of underlying right knee arthritis.  No structural instability.  No substantial degenerative changes evident on x-ray however suspect if MRI was obtained would see areas of cartilage loss.  This acute flare is likely causing his effusion.  Patient has not responded to previous conservative measures to include activity modifications and physical therapy.  Discussed management options to include obtaining MRI to assess for internal damage versus scheduling clinic appointment for aspiration and injection of his right knee.  In the meantime patient will continue activities as tolerated.  If he wants to pursue knee injection he will schedule accordingly for this.  All questions answered and patient agrees to this plan of care.    Chief complaint:  Right knee pain    HPI:  59-year-old male presents to the Ortho injury clinic with right knee pain x 2.5 months.  No specific injuries recalled however he does associate onset after going skiing.  Pain is localized to anterior knee.  Pain increases with the use of stairs.  He was previously been on the Ortho provider and referred to physical therapy which he completed and did not resolve his condition.  He reports taking a break from exercise activities to include running for 4-5 weeks.  He has started to exercise again and is able to tolerate for the most part.  Denies sensation of instability.  No locking symptoms.  No previous knee surgery.    Exam:  RIGHT Knee examined.  Effusion present. No ecchymosis, warmth or erythema.  AROM from 0 to 130 deg with 5/5 strength.  Intact extensor mechanism.  SILT overlying knee. Motion crepitus present. Tenderness along medial and lateral joint lines.  No popliteal mass palpated. Negative anterior and posterior drawer.  No  laxity to varus or valgus stress at 0 or 30 deg.  No patellar apprehension.-Amanda    Results:  X-rays of right knee obtained 1/23/2024 reviewed and independent interpreted as no acute fracture with normal alignment.  Suprapatellar effusion present.

## 2024-03-05 ENCOUNTER — DOCUMENTATION (OUTPATIENT)
Dept: PHYSICAL THERAPY | Facility: HOSPITAL | Age: 59
End: 2024-03-05
Payer: COMMERCIAL

## 2024-03-05 NOTE — PROGRESS NOTES
Physical Therapy    Discharge Summary    Name: Errol Das  MRN: [id number]  : 1965  Date: 24    Discharge Summary: PT    Discharge Information: Date of discharge 2024, Date of last visit 2024, Date of evaluation 2024, and Number of attended visits 1    Discharge Status: Discharged     Rehab Discharge Reason: Attendance inconsistent and Failed to schedule and/or keep follow-up appointment(s)

## 2024-03-14 ENCOUNTER — OFFICE VISIT (OUTPATIENT)
Dept: ORTHOPEDIC SURGERY | Facility: HOSPITAL | Age: 59
End: 2024-03-14
Payer: COMMERCIAL

## 2024-03-14 ENCOUNTER — LAB (OUTPATIENT)
Dept: LAB | Facility: HOSPITAL | Age: 59
End: 2024-03-14
Payer: COMMERCIAL

## 2024-03-14 VITALS — HEIGHT: 68 IN | BODY MASS INDEX: 24.25 KG/M2 | WEIGHT: 160 LBS

## 2024-03-14 DIAGNOSIS — M17.11 ARTHRITIS OF RIGHT KNEE: ICD-10-CM

## 2024-03-14 DIAGNOSIS — M25.461 EFFUSION OF RIGHT KNEE: Primary | ICD-10-CM

## 2024-03-14 DIAGNOSIS — C79.89: ICD-10-CM

## 2024-03-14 DIAGNOSIS — C79.89 SECONDARY MALIGNANT NEOPLASM OF OTHER SPECIFIED SITES (MULTI): Primary | ICD-10-CM

## 2024-03-14 DIAGNOSIS — Z92.89 HISTORY OF IMMUNOTHERAPY: ICD-10-CM

## 2024-03-14 DIAGNOSIS — E03.9 HYPOTHYROIDISM, UNSPECIFIED TYPE: ICD-10-CM

## 2024-03-14 LAB
ALBUMIN SERPL BCP-MCNC: 4.5 G/DL (ref 3.4–5)
ALP SERPL-CCNC: 67 U/L (ref 33–120)
ALT SERPL W P-5'-P-CCNC: 8 U/L (ref 10–52)
ANION GAP SERPL CALC-SCNC: 14 MMOL/L (ref 10–20)
AST SERPL W P-5'-P-CCNC: 14 U/L (ref 9–39)
BASOPHILS # BLD AUTO: 0.08 X10*3/UL (ref 0–0.1)
BASOPHILS NFR BLD AUTO: 1 %
BASOPHILS NFR FLD MANUAL: 0 %
BILIRUB SERPL-MCNC: 0.4 MG/DL (ref 0–1.2)
BLASTS NFR FLD MANUAL: 0 %
BUN SERPL-MCNC: 14 MG/DL (ref 6–23)
CALCIUM SERPL-MCNC: 9.5 MG/DL (ref 8.6–10.3)
CCP IGG SERPL-ACNC: <1 U/ML
CHLORIDE SERPL-SCNC: 101 MMOL/L (ref 98–107)
CLARITY FLD: ABNORMAL
CO2 SERPL-SCNC: 28 MMOL/L (ref 21–32)
COLOR FLD: YELLOW
CREAT SERPL-MCNC: 0.69 MG/DL (ref 0.5–1.3)
CRP SERPL-MCNC: 1.47 MG/DL
CRYSTALS FLD MICRO: NORMAL
DSDNA AB SER-ACNC: 1 IU/ML
EGFRCR SERPLBLD CKD-EPI 2021: >90 ML/MIN/1.73M*2
EOSINOPHIL # BLD AUTO: 1.28 X10*3/UL (ref 0–0.7)
EOSINOPHIL NFR BLD AUTO: 15.8 %
EOSINOPHIL NFR FLD MANUAL: 0 %
ERYTHROCYTE [DISTWIDTH] IN BLOOD BY AUTOMATED COUNT: 12.6 % (ref 11.5–14.5)
ERYTHROCYTE [SEDIMENTATION RATE] IN BLOOD BY WESTERGREN METHOD: 27 MM/H (ref 0–20)
GLUCOSE SERPL-MCNC: 95 MG/DL (ref 74–99)
HCT VFR BLD AUTO: 40.9 % (ref 41–52)
HGB BLD-MCNC: 13.7 G/DL (ref 13.5–17.5)
IMM GRANULOCYTES # BLD AUTO: 0.02 X10*3/UL (ref 0–0.7)
IMM GRANULOCYTES NFR BLD AUTO: 0.2 % (ref 0–0.9)
IMMATURE GRANULOCYTES IN FLUID: 0 %
LDH SERPL L TO P-CCNC: 138 U/L (ref 84–246)
LYMPHOCYTES # BLD AUTO: 1.56 X10*3/UL (ref 1.2–4.8)
LYMPHOCYTES NFR BLD AUTO: 19.3 %
LYMPHOCYTES NFR FLD MANUAL: 2 %
MCH RBC QN AUTO: 30.5 PG (ref 26–34)
MCHC RBC AUTO-ENTMCNC: 33.5 G/DL (ref 32–36)
MCV RBC AUTO: 91 FL (ref 80–100)
MONOCYTES # BLD AUTO: 0.5 X10*3/UL (ref 0.1–1)
MONOCYTES NFR BLD AUTO: 6.2 %
MONOS+MACROS NFR FLD MANUAL: 9 %
NEUTROPHILS # BLD AUTO: 4.64 X10*3/UL (ref 1.2–7.7)
NEUTROPHILS NFR BLD AUTO: 57.5 %
NEUTROPHILS NFR FLD MANUAL: 89 %
NRBC BLD-RTO: 0 /100 WBCS (ref 0–0)
OTHER CELLS NFR FLD MANUAL: 0 %
PLASMA CELLS NFR FLD MANUAL: 0 %
PLATELET # BLD AUTO: 358 X10*3/UL (ref 150–450)
POTASSIUM SERPL-SCNC: 4.2 MMOL/L (ref 3.5–5.3)
PROT SERPL-MCNC: 8 G/DL (ref 6.4–8.2)
RBC # BLD AUTO: 4.49 X10*6/UL (ref 4.5–5.9)
RBC # FLD AUTO: 4000 /UL
RHEUMATOID FACT SER NEPH-ACNC: <10 IU/ML (ref 0–15)
SODIUM SERPL-SCNC: 139 MMOL/L (ref 136–145)
TOTAL CELLS COUNTED SNV: 100
TSH SERPL-ACNC: 2.49 MIU/L (ref 0.44–3.98)
URATE SERPL-MCNC: 4.2 MG/DL (ref 4–7.5)
WBC # BLD AUTO: 8.1 X10*3/UL (ref 4.4–11.3)
WBC # FLD AUTO: ABNORMAL /UL

## 2024-03-14 PROCEDURE — 89051 BODY FLUID CELL COUNT: CPT | Performed by: FAMILY MEDICINE

## 2024-03-14 PROCEDURE — 2500000004 HC RX 250 GENERAL PHARMACY W/ HCPCS (ALT 636 FOR OP/ED): Performed by: FAMILY MEDICINE

## 2024-03-14 PROCEDURE — 86225 DNA ANTIBODY NATIVE: CPT | Performed by: FAMILY MEDICINE

## 2024-03-14 PROCEDURE — 86200 CCP ANTIBODY: CPT | Performed by: FAMILY MEDICINE

## 2024-03-14 PROCEDURE — 89060 EXAM SYNOVIAL FLUID CRYSTALS: CPT | Mod: AHULAB | Performed by: FAMILY MEDICINE

## 2024-03-14 PROCEDURE — 84443 ASSAY THYROID STIM HORMONE: CPT

## 2024-03-14 PROCEDURE — 83615 LACTATE (LD) (LDH) ENZYME: CPT

## 2024-03-14 PROCEDURE — 36415 COLL VENOUS BLD VENIPUNCTURE: CPT

## 2024-03-14 PROCEDURE — 86140 C-REACTIVE PROTEIN: CPT | Performed by: FAMILY MEDICINE

## 2024-03-14 PROCEDURE — 1036F TOBACCO NON-USER: CPT | Performed by: FAMILY MEDICINE

## 2024-03-14 PROCEDURE — 80053 COMPREHEN METABOLIC PANEL: CPT

## 2024-03-14 PROCEDURE — 99214 OFFICE O/P EST MOD 30 MIN: CPT | Performed by: FAMILY MEDICINE

## 2024-03-14 PROCEDURE — 86431 RHEUMATOID FACTOR QUANT: CPT | Performed by: FAMILY MEDICINE

## 2024-03-14 PROCEDURE — 87070 CULTURE OTHR SPECIMN AEROBIC: CPT | Mod: AHULAB | Performed by: FAMILY MEDICINE

## 2024-03-14 PROCEDURE — 2500000005 HC RX 250 GENERAL PHARMACY W/O HCPCS: Performed by: FAMILY MEDICINE

## 2024-03-14 PROCEDURE — 20611 DRAIN/INJ JOINT/BURSA W/US: CPT | Performed by: FAMILY MEDICINE

## 2024-03-14 PROCEDURE — 85025 COMPLETE CBC W/AUTO DIFF WBC: CPT

## 2024-03-14 PROCEDURE — 85652 RBC SED RATE AUTOMATED: CPT | Performed by: FAMILY MEDICINE

## 2024-03-14 PROCEDURE — 84550 ASSAY OF BLOOD/URIC ACID: CPT | Performed by: FAMILY MEDICINE

## 2024-03-14 RX ORDER — TRIAMCINOLONE ACETONIDE 40 MG/ML
40 INJECTION, SUSPENSION INTRA-ARTICULAR; INTRAMUSCULAR
Status: COMPLETED | OUTPATIENT
Start: 2024-03-14 | End: 2024-03-14

## 2024-03-14 RX ORDER — LIDOCAINE HYDROCHLORIDE 10 MG/ML
4 INJECTION INFILTRATION; PERINEURAL
Status: COMPLETED | OUTPATIENT
Start: 2024-03-14 | End: 2024-03-14

## 2024-03-14 RX ADMIN — LIDOCAINE HYDROCHLORIDE 4 ML: 10 INJECTION, SOLUTION INFILTRATION; PERINEURAL at 10:13

## 2024-03-14 RX ADMIN — TRIAMCINOLONE ACETONIDE 40 MG: 400 INJECTION, SUSPENSION INTRA-ARTICULAR; INTRAMUSCULAR at 10:13

## 2024-03-14 ASSESSMENT — PAIN SCALES - GENERAL: PAINLEVEL_OUTOF10: 2

## 2024-03-14 ASSESSMENT — PAIN DESCRIPTION - DESCRIPTORS: DESCRIPTORS: ACHING

## 2024-03-14 ASSESSMENT — PAIN - FUNCTIONAL ASSESSMENT: PAIN_FUNCTIONAL_ASSESSMENT: 0-10

## 2024-03-14 NOTE — PROGRESS NOTES
** Please excuse any errors in grammar or translation related to this dictation. Voice recognition software was utilized to prepare this document. **    Assessment & Plan:  Based on appearance of knee aspirate and given atraumatic effusion, concern for inflammatory arthritis.  Knee fluid aspirate sent for crystal analysis and culture.  Will obtain arthritis panel to screen for rheumatological conditions and uric acid levels.  Will notify patient of results.  If lab results indicate inflammatory process will recommend patient be evaluated by rheumatology.  If negative and pain persisting consider MRI to assess for underlying cartilage injury.  Informed patient that the effect of the steroid medication should take 3 to 5 days to receive benefit.  Return precautions given.    Chief complaint:  Right knee swelling    HPI:  3/14/2024: Following up for atraumatic right knee pain.  Reports that had increase in pain about a week ago that lasted for couple days.  No new injury reported.  Denies history of gout or pseudogout.  Denies family history of rheumatological disorders.    3/4/24: 59-year-old male presents to the Ortho injury clinic with right knee pain x 2.5 months.  No specific injuries recalled however he does associate onset after going skiing.  Pain is localized to anterior knee.  Pain increases with the use of stairs.  He was previously been on the Ortho provider and referred to physical therapy which he completed and did not resolve his condition.  He reports taking a break from exercise activities to include running for 4-5 weeks.  He has started to exercise again and is able to tolerate for the most part.  Denies sensation of instability.  No locking symptoms.  No previous knee surgery.       Exam:  RIGHT Knee examined.  Large effusion present. No ecchymosis, warmth or erythema.  AROM from 0 to 130 deg with 5/5 strength. Intact extensor mechanism.  SILT overlying knee. Motion crepitus present. Tenderness  along medial and lateral joint lines.  No popliteal mass palpated. Negative anterior and posterior drawer.  No laxity to varus or valgus stress at 0 or 30 deg.  No patellar apprehension.-Amanda    Results:  X-rays of right knee obtained 1/23/2024: no acute fracture with normal alignment. Suprapatellar effusion presen     Procedure:  Patient ID: Errol Das is a 59 y.o. male.    L Inj/Asp: R knee on 3/14/2024 10:13 AM  Indications: pain  Details: 18 G needle, ultrasound-guided superolateral approach  Medications: 40 mg triamcinolone acetonide 40 mg/mL; 4 mL lidocaine 10 mg/mL (1 %)  Aspirate: 77 mL yellow and cloudy; sent for lab analysis  Outcome: tolerated well, no immediate complications    Procedure risk factors to include increased pain, bleeding, infection, neurovascular injury, soft tissue injury, transient elevation of blood glucose and blood pressure, and adverse reaction to medication were discussed with the patient. Patient understands there is a moderate risk of morbidity from undergoing the procedure.  Procedure, treatment alternatives, risks and benefits explained, specific risks discussed. Consent was given by the patient. Immediately prior to procedure a time out was called to verify the correct patient, procedure, equipment, support staff and site/side marked as required. Patient was prepped and draped in the usual sterile fashion.

## 2024-03-15 ENCOUNTER — HOSPITAL ENCOUNTER (OUTPATIENT)
Dept: RADIOLOGY | Facility: HOSPITAL | Age: 59
Discharge: HOME | End: 2024-03-15
Payer: COMMERCIAL

## 2024-03-15 DIAGNOSIS — C79.89: ICD-10-CM

## 2024-03-15 PROCEDURE — 74177 CT ABD & PELVIS W/CONTRAST: CPT | Performed by: RADIOLOGY

## 2024-03-15 PROCEDURE — 70491 CT SOFT TISSUE NECK W/DYE: CPT | Performed by: RADIOLOGY

## 2024-03-15 PROCEDURE — 74177 CT ABD & PELVIS W/CONTRAST: CPT

## 2024-03-15 PROCEDURE — 2550000001 HC RX 255 CONTRASTS: Performed by: NURSE PRACTITIONER

## 2024-03-15 PROCEDURE — 71260 CT THORAX DX C+: CPT | Performed by: RADIOLOGY

## 2024-03-15 PROCEDURE — 70491 CT SOFT TISSUE NECK W/DYE: CPT

## 2024-03-15 RX ADMIN — IOHEXOL 120 ML: 350 INJECTION, SOLUTION INTRAVENOUS at 12:19

## 2024-03-17 LAB
BACTERIA FLD CULT: NORMAL
GRAM STN SPEC: NORMAL
GRAM STN SPEC: NORMAL

## 2024-03-18 DIAGNOSIS — M19.90 INFLAMMATORY ARTHRITIS: Primary | ICD-10-CM

## 2024-03-19 ENCOUNTER — APPOINTMENT (OUTPATIENT)
Dept: HEMATOLOGY/ONCOLOGY | Facility: HOSPITAL | Age: 59
End: 2024-03-19
Payer: COMMERCIAL

## 2024-03-19 ENCOUNTER — OFFICE VISIT (OUTPATIENT)
Dept: HEMATOLOGY/ONCOLOGY | Facility: HOSPITAL | Age: 59
End: 2024-03-19
Payer: COMMERCIAL

## 2024-03-19 VITALS
OXYGEN SATURATION: 95 % | WEIGHT: 171.3 LBS | RESPIRATION RATE: 18 BRPM | DIASTOLIC BLOOD PRESSURE: 75 MMHG | BODY MASS INDEX: 26.89 KG/M2 | TEMPERATURE: 98.1 F | HEIGHT: 67 IN | SYSTOLIC BLOOD PRESSURE: 130 MMHG | HEART RATE: 61 BPM

## 2024-03-19 DIAGNOSIS — C79.89: ICD-10-CM

## 2024-03-19 DIAGNOSIS — E03.9 HYPOTHYROIDISM, UNSPECIFIED TYPE: ICD-10-CM

## 2024-03-19 DIAGNOSIS — Z92.89 HISTORY OF IMMUNOTHERAPY: ICD-10-CM

## 2024-03-19 DIAGNOSIS — C79.89 METASTATIC MELANOMA TO PAROTID GLAND (MULTI): Primary | ICD-10-CM

## 2024-03-19 PROCEDURE — 1036F TOBACCO NON-USER: CPT | Performed by: NURSE PRACTITIONER

## 2024-03-19 PROCEDURE — 99215 OFFICE O/P EST HI 40 MIN: CPT | Performed by: NURSE PRACTITIONER

## 2024-03-19 RX ORDER — LEVOTHYROXINE SODIUM 112 UG/1
112 TABLET ORAL
Qty: 30 TABLET | Refills: 11 | Status: SHIPPED | OUTPATIENT
Start: 2024-03-19 | End: 2025-03-19

## 2024-03-19 ASSESSMENT — ENCOUNTER SYMPTOMS
ENDOCRINE NEGATIVE: 1
NEUROLOGICAL NEGATIVE: 1
PSYCHIATRIC NEGATIVE: 1
HEMATOLOGIC/LYMPHATIC NEGATIVE: 1
CARDIOVASCULAR NEGATIVE: 1
EYES NEGATIVE: 1
RESPIRATORY NEGATIVE: 1
CONSTITUTIONAL NEGATIVE: 1
MUSCULOSKELETAL NEGATIVE: 1
GASTROINTESTINAL NEGATIVE: 1

## 2024-03-19 ASSESSMENT — PAIN SCALES - GENERAL: PAINLEVEL: 0-NO PAIN

## 2024-03-19 NOTE — PROGRESS NOTES
".Patient ID: Errol Das is a 59 y.o. male.  Referring Physician: Alisson Fishman, APRN-CNP  [id number] [place] [address]  Primary Care Provider: Keith Matos MD MPH     Oncology follow up     HPI  Mr. Das is referred to our clinic by Dr. 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 He met with our team on 5/31/22.      Mr. Das met with Dr. Bhat on 4/15/22. He was referred by his PCP after the patient told them about a lump in the parotid gland region since beginning of 2022. the lump had grown significantly. He underwent FNAC on 4/28/22 and the path read  melanoma. Then he was taken to OR on 5/23/22 and underwent resection. Pathology report reads 1/16 positive nodes. The lone node has 6.5mm deposit with MELANIE. The parotid mass is necrotic and measures 2.3cm. Negative for BRAF V600.     PET scan (on 7/11/22) and MRI brain (7/14/22) did not show any activity concerning for tumors. We recommend adjuvant pembrolizumab. Patient consented on 7/19/22 and started Keytruda 400mg every 6 weeks on 7/26/22. He completed one year of therapy on 08/01/2023 and is currently on surveillance.     Treatment History:    Systemic treatment  1. Pembrolizumab 400mg every 6 weeks- s/p 9 cycles as of 08/01/2023     irAE's  1. Hypothyroidism (currently on Synthroid 112mcg PO daily).     Subjective   Please refer to \"Notes/Cancer History\" above for complete History of present illness.     Mr Errol Das     - Presents to clinic alone.   - Feels well overall.   - Met with ortho in March for right knee swelling. Swelling resolved after aspiration. Concern for inflammatory arthritis.  He is meeting with rheumatology in April.   - Continues skin checks with dermatology.      Review of Systems:   Review of Systems   Constitutional: Negative.    HENT:  Negative.     Eyes: Negative.    Respiratory: Negative.     Cardiovascular: Negative.    Gastrointestinal: Negative.  " "  Endocrine: Negative.    Genitourinary: Negative.     Musculoskeletal: Negative.         ?inflammatory arthritis. No joint pain at this time. Right knee swelling resolved.   Skin: Negative.    Neurological: Negative.    Hematological: Negative.    Psychiatric/Behavioral: Negative.           MEDICAL HISTORY  Melanoma, hypothyroidism     FAMILY HISTORY  No family history on file.    TOBACCO HISTORY  Tobacco Use: Low Risk  (3/19/2024)    Patient History     Smoking Tobacco Use: Never     Smokeless Tobacco Use: Never     Passive Exposure: Not on file       SOCIAL HISTORY  Social Connections: Not on file   , lives with his wife. Has a son and daughter. Works full time as a professor at [employer].       Outpatient Medication Profile:  Current Outpatient Medications on File Prior to Visit   Medication Sig Dispense Refill    levothyroxine (Synthroid, Levoxyl) 100 mcg tablet Take 1 tablet (100 mcg) by mouth once daily.       No current facility-administered medications on file prior to visit.         Performance Status:  Asymptomatic     Vitals and Measurements:   /75 (BP Location: Left arm, Patient Position: Sitting, BP Cuff Size: Adult)   Pulse 61   Temp 36.7 °C (98.1 °F) (Temporal)   Resp 18   Ht (S) 1.709 m (5' 7.28\")   Wt 77.7 kg (171 lb 4.8 oz)   SpO2 95%   BMI 26.60 kg/m²       Physical Exam:   Physical Exam  Constitutional:       Appearance: Normal appearance.   HENT:      Head: Normocephalic and atraumatic.      Nose: Nose normal.      Mouth/Throat:      Mouth: Mucous membranes are moist.      Pharynx: Oropharynx is clear.   Eyes:      Conjunctiva/sclera: Conjunctivae normal.   Neck:      Comments: Healed incisions without nodules   Cardiovascular:      Rate and Rhythm: Normal rate and regular rhythm.      Pulses: Normal pulses.      Heart sounds: Normal heart sounds.   Pulmonary:      Effort: Pulmonary effort is normal.      Breath sounds: Normal breath sounds.   Abdominal:      General: Bowel " sounds are normal.      Palpations: Abdomen is soft.   Musculoskeletal:         General: Normal range of motion.      Cervical back: Neck supple.   Skin:     General: Skin is warm and dry.   Neurological:      General: No focal deficit present.      Mental Status: He is alert and oriented to person, place, and time.   Psychiatric:         Mood and Affect: Mood normal.         Behavior: Behavior normal.      Lab Results:  I have reviewed these laboratory results:     Lab on 03/14/2024   Component Date Value Ref Range Status    WBC 03/14/2024 8.1  4.4 - 11.3 x10*3/uL Final    nRBC 03/14/2024 0.0  0.0 - 0.0 /100 WBCs Final    RBC 03/14/2024 4.49 (L)  4.50 - 5.90 x10*6/uL Final    Hemoglobin 03/14/2024 13.7  13.5 - 17.5 g/dL Final    Hematocrit 03/14/2024 40.9 (L)  41.0 - 52.0 % Final    MCV 03/14/2024 91  80 - 100 fL Final    MCH 03/14/2024 30.5  26.0 - 34.0 pg Final    MCHC 03/14/2024 33.5  32.0 - 36.0 g/dL Final    RDW 03/14/2024 12.6  11.5 - 14.5 % Final    Platelets 03/14/2024 358  150 - 450 x10*3/uL Final    Neutrophils % 03/14/2024 57.5  40.0 - 80.0 % Final    Immature Granulocytes %, Automated 03/14/2024 0.2  0.0 - 0.9 % Final    Lymphocytes % 03/14/2024 19.3  13.0 - 44.0 % Final    Monocytes % 03/14/2024 6.2  2.0 - 10.0 % Final    Eosinophils % 03/14/2024 15.8  0.0 - 6.0 % Final    Basophils % 03/14/2024 1.0  0.0 - 2.0 % Final    Neutrophils Absolute 03/14/2024 4.64  1.20 - 7.70 x10*3/uL Final    Immature Granulocytes Absolute, Au* 03/14/2024 0.02  0.00 - 0.70 x10*3/uL Final    Lymphocytes Absolute 03/14/2024 1.56  1.20 - 4.80 x10*3/uL Final    Monocytes Absolute 03/14/2024 0.50  0.10 - 1.00 x10*3/uL Final    Eosinophils Absolute 03/14/2024 1.28 (H)  0.00 - 0.70 x10*3/uL Final    Basophils Absolute 03/14/2024 0.08  0.00 - 0.10 x10*3/uL Final    Glucose 03/14/2024 95  74 - 99 mg/dL Final    Sodium 03/14/2024 139  136 - 145 mmol/L Final    Potassium 03/14/2024 4.2  3.5 - 5.3 mmol/L Final    Chloride 03/14/2024  101  98 - 107 mmol/L Final    Bicarbonate 03/14/2024 28  21 - 32 mmol/L Final    Anion Gap 03/14/2024 14  10 - 20 mmol/L Final    Urea Nitrogen 03/14/2024 14  6 - 23 mg/dL Final    Creatinine 03/14/2024 0.69  0.50 - 1.30 mg/dL Final    eGFR 03/14/2024 >90  >60 mL/min/1.73m*2 Final    Calcium 03/14/2024 9.5  8.6 - 10.3 mg/dL Final    Albumin 03/14/2024 4.5  3.4 - 5.0 g/dL Final    Alkaline Phosphatase 03/14/2024 67  33 - 120 U/L Final    Total Protein 03/14/2024 8.0  6.4 - 8.2 g/dL Final    AST 03/14/2024 14  9 - 39 U/L Final    Bilirubin, Total 03/14/2024 0.4  0.0 - 1.2 mg/dL Final    ALT 03/14/2024 8 (L)  10 - 52 U/L Final    LDH 03/14/2024 138  84 - 246 U/L Final    Thyroid Stimulating Hormone 03/14/2024 2.49  0.44 - 3.98 mIU/L Final   Office Visit on 03/14/2024   Component Date Value Ref Range Status    Sterile Fluid Culture/Smear 03/14/2024 No growth aerobically and anaerobically   Final    Gram Stain 03/14/2024 (4+) Abundant Polymorphonuclear leukocytes   Final    Gram Stain 03/14/2024 No organisms seen   Final    Color, Fluid 03/14/2024 Yellow  Colorless, Straw, Yellow Final    Clarity, Fluid 03/14/2024 Cloudy (A)  Clear Final    WBC, Fluid 03/14/2024 15,057  See Comment /uL Final    RBC, Fluid 03/14/2024 4,000  0  /uL /uL Final    Neutrophils %, Manual, Fluid 03/14/2024 89  <25 % % Final    Lymphocytes %, Manual, Fluid 03/14/2024 2  <75 % % Final    Mono/Macrophages %, Manual, Fluid 03/14/2024 9  <70 % % Final    Eosinophils %, Manual, Fluid 03/14/2024 0  0 % % Final    Basophils %, Manual, Fluid 03/14/2024 0  0 % % Final    Immature Granulocytes %, Manual, F* 03/14/2024 0  0 % % Final    Blasts %, Manual, Fluid 03/14/2024 0  0 % % Final    Unclassified Cells %, Manual, Fluid 03/14/2024 0  0 % % Final    Plasma Cells %, Manual, Fluid 03/14/2024 0  0 % % Final    Total Cells Counted, Fluid 03/14/2024 100   Final    Uric Acid 03/14/2024 4.2  4.0 - 7.5 mg/dL Final    Sedimentation Rate 03/14/2024 27 (H)  0 - 20  "mm/h Final    Rheumatoid Factor 03/14/2024 <10  0 - 15 IU/mL Final    Citrulline Antibody, IgG 03/14/2024 <1  <3 U/mL Final    C-Reactive Protein 03/14/2024 1.47 (H)  <1.00 mg/dL Final    Anti-DNA (DS) 03/14/2024 1.0  <5.0 IU/mL Final    Crystal Identification, Synovial F* 03/14/2024 No crystals seen by bright-field or first order red axis polarization microscopy.  No crystals seen by bright-field or first order red axis polarization microscopy. Final         Radiology Result:  I have reviewed the latest Imaging in PACS and the findings are noted in this note. I discussed the results of the latest imaging with the patient. All previous imaging were reviewed at the time it was completed. Full records are available in the EMR for review as well.     CT SOFT TISSUE NECK W IV CONTRAST; 3/15/2024 12:20 pm     IMPRESSION:  Stable postsurgical changes of right parotid gland resection with no  evidence of significant cervical adenopathy or a soft tissue mass in  the neck.    =========================================================  CT CHEST ABDOMEN PELVIS W IV CONTRAST; 3/15/2024 12:18 pm     IMPRESSION:  1. Stable findings without evidence of disease     Pathology Results:  I have reviewed the full pathology report recorded in the EMR. The pertinent portions indicating diagnosis are listed here in the note. for details please refer to the full report recorded in the EMR.    Surgical Pathology [Mohan 10 2022 2:15PM] ([id number])  Specimens: RIGHT SUPERFICIAL PAROTIDECTOMY OVER LINE FACIAL SKIN. LONG STITCH  SUPERIOR   Name MATTIE MCCULLOUGH     Accession #: [id number]   Pathologist: TYSON LARKIN MD, Board Certified   Dermatopathologist   Date of Procedure: 5/23/2022   Date Received: 5/23/2022   Date Reported 6/10/2022    Submitting Physician: JORGE POWELL MD   Location: [facility] Other External #     FINAL DIAGNOSIS   A. SPECIMEN DESIGNATED \"RIGHT SUPERFICIAL PAROTIDECTOMY OVER LINE FACIAL   SKIN\":   --METASTATIC " MELANOMA INVOLVING AT LEAST ONE  OF SIXTEEN INTRAPAROTID LYMPH NODES WITH EXTRACAPSULAR EXTENSION (1/16), SEE COMMENT   --SKIN WITH AN ATYPICAL JUNCTIONAL MELANOCYTIC PROLIFERATION, LOCALIZED AREA OF DERMAL MELANOSIS AND BIOPSY SITE CHANGES, SEE COMMENT     COMMENT: Sections  show actinically altered skin with increased single mild to moderately enlarged junctional melanocytes with poor circumscription, focal areas of confluence and involvement of superficial follicular epithelium, present at peripheral margins. There is a  localized collection of dermal and subcutaneous melanophages approximately 6.5 mm in depth, involving the superior margin. There is a predominantly necrotic intraparotid mass (2.3 cm) with focal collections of severely atypical melanocytes and extensive  melanosis, present at the superficial margin and extending < 1 mm from the deep and anterior margins. Melanocytes are highlighted with SOX-10 and Melan-A. Intraparotid melanocytes and isolated junctional melanocytes are PRAME positive. (Controlsstain  adequately.)     The skin findings are concerning for regressed invasive melanoma, lentigo maligna type combined with actinic melanocytosis of sun-damaged skin and reactive melanocytic hyperplasia from the trauma of the previous procedure. The  intraparotid mass may represent multiple matted positive lymph nodes, the exact number of which cannot be determined and is therefore counted as one positive lymph node with extracapsular extension. Results of BRAF testing will be reported separately.    Electronically Signed Out By TYSON LARKIN MD, Board Certified Dermatopathologist/SWATI   By the signature on this report, the individual or group listed as making the Final Interpretation/Diagnosis certifies that they have reviewed this case. Diagnostic  interpretation performed at [facility] [address] [facility] [address]      =============================================  Addendum  Diagnosis   TEST: BRAF Exon 15 Sequencing   SPECIMEN: FFPE, Mass with anterior, superficial, deep margin, Excision,   [id number]   DISEASE  DIAGNOSIS: Metastatic Melanoma   Estimated Tumor Content: 40%   COLLECTION DATE: 05/23/2022   RECEIVED DATE: 06/13/2022   REPORT DATE: 06/17/2022     RESULT: None     DISEASE RELEVANT ALTERATIONS NOT DETECTED:   Negative for BRAF V600 mutations.      Assessment and Plan:   Assessment/Plan   Mr. Errol Das is a 59 y.o. 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 Completed one year of adjuvant Pembrolizumab 08/01/2023. He is currently on surveillance.      # Melanoma  - CT 03/15/2024 showing stable changes and KAYLEN.   - RTC 06/25/2024.  - Labs and CT scan prior 06/20/2024.  - Continue f/u with dermatology for skin checks. Next visit with Dr. Nieto 06/21/2024.     # Hypothyroidism   - TSH 2.49.   - Continue levothyroxine 112mcg PO daily.      # Area of thickening on CT scan  - Visually inspected the area. The CT scan is corresponding to the scar he has in his groin where the fat was taken from for the reconstructive surgery.      DISCLAIMER:   In preparing for this visit and writing this note, I reviewed all the previous electronic medical records (labs, imaging and medical charts) of the patient available in the physician portal. Significant findings which helped in decision making are recorded  in this chart.     The plan was discussed with the patient. We gave him ample opportunities to ask questions. All questions were answered to his satisfaction and he verbalized understanding.      INSTRUCTIONS FOR PATIENT  Mr. Errol Das ,  It was a pleasure talking to you today.    As discussed, we will be conducting surveillance scans in 3 months on 06/20/2024. We will meet again on 06/25/2024. Please keep your appointments with dermatology and surgical oncology.     Please make sure  to schedule your appointments as we discussed. Your appointments should also appear in your MyChart.   In case of an emergency please dial 911 or report to your nearest Emergency Room.  For all other questions, please do not hesitate to reach out to us at the number listed below.    Thank you for choosing [facility] at [facility].   We appreciate your visit.     MD Alisson Clifford, ARIANE Matthew RN ([facility] location)  Ju Schmidt RN ([place] location)    Sarcoma and Cutaneous Oncology team    Phone: [phone]  Fax: [phone].

## 2024-04-29 ENCOUNTER — APPOINTMENT (OUTPATIENT)
Dept: RHEUMATOLOGY | Facility: CLINIC | Age: 59
End: 2024-04-29
Payer: COMMERCIAL

## 2024-04-29 ENCOUNTER — LAB (OUTPATIENT)
Dept: LAB | Facility: LAB | Age: 59
End: 2024-04-29
Payer: COMMERCIAL

## 2024-04-29 ENCOUNTER — OFFICE VISIT (OUTPATIENT)
Dept: RHEUMATOLOGY | Facility: CLINIC | Age: 59
End: 2024-04-29
Payer: COMMERCIAL

## 2024-04-29 VITALS
BODY MASS INDEX: 25.91 KG/M2 | HEIGHT: 68 IN | WEIGHT: 171 LBS | DIASTOLIC BLOOD PRESSURE: 67 MMHG | HEART RATE: 58 BPM | SYSTOLIC BLOOD PRESSURE: 104 MMHG

## 2024-04-29 DIAGNOSIS — Z92.89 HISTORY OF IMMUNOTHERAPY: Primary | ICD-10-CM

## 2024-04-29 DIAGNOSIS — M19.90 INFLAMMATORY ARTHRITIS: ICD-10-CM

## 2024-04-29 LAB
ALBUMIN SERPL BCP-MCNC: 4.6 G/DL (ref 3.4–5)
ALP SERPL-CCNC: 61 U/L (ref 33–120)
ALT SERPL W P-5'-P-CCNC: 14 U/L (ref 10–52)
ANION GAP SERPL CALC-SCNC: 13 MMOL/L (ref 10–20)
AST SERPL W P-5'-P-CCNC: 21 U/L (ref 9–39)
BASOPHILS # BLD AUTO: 0.1 X10*3/UL (ref 0–0.1)
BASOPHILS NFR BLD AUTO: 1.8 %
BILIRUB SERPL-MCNC: 0.6 MG/DL (ref 0–1.2)
BUN SERPL-MCNC: 15 MG/DL (ref 6–23)
CALCIUM SERPL-MCNC: 9.4 MG/DL (ref 8.6–10.6)
CHLORIDE SERPL-SCNC: 101 MMOL/L (ref 98–107)
CO2 SERPL-SCNC: 27 MMOL/L (ref 21–32)
CREAT SERPL-MCNC: 0.77 MG/DL (ref 0.5–1.3)
CRP SERPL-MCNC: 0.25 MG/DL
EGFRCR SERPLBLD CKD-EPI 2021: >90 ML/MIN/1.73M*2
EOSINOPHIL # BLD AUTO: 0.79 X10*3/UL (ref 0–0.7)
EOSINOPHIL NFR BLD AUTO: 14.4 %
ERYTHROCYTE [DISTWIDTH] IN BLOOD BY AUTOMATED COUNT: 14.5 % (ref 11.5–14.5)
GLUCOSE SERPL-MCNC: 83 MG/DL (ref 74–99)
HCT VFR BLD AUTO: 40.5 % (ref 41–52)
HGB BLD-MCNC: 13.1 G/DL (ref 13.5–17.5)
IMM GRANULOCYTES # BLD AUTO: 0.01 X10*3/UL (ref 0–0.7)
IMM GRANULOCYTES NFR BLD AUTO: 0.2 % (ref 0–0.9)
LYMPHOCYTES # BLD AUTO: 1.71 X10*3/UL (ref 1.2–4.8)
LYMPHOCYTES NFR BLD AUTO: 31.1 %
MCH RBC QN AUTO: 30.5 PG (ref 26–34)
MCHC RBC AUTO-ENTMCNC: 32.3 G/DL (ref 32–36)
MCV RBC AUTO: 94 FL (ref 80–100)
MONOCYTES # BLD AUTO: 0.49 X10*3/UL (ref 0.1–1)
MONOCYTES NFR BLD AUTO: 8.9 %
NEUTROPHILS # BLD AUTO: 2.4 X10*3/UL (ref 1.2–7.7)
NEUTROPHILS NFR BLD AUTO: 43.6 %
NRBC BLD-RTO: 0 /100 WBCS (ref 0–0)
PLATELET # BLD AUTO: 250 X10*3/UL (ref 150–450)
POTASSIUM SERPL-SCNC: 4.4 MMOL/L (ref 3.5–5.3)
PROT SERPL-MCNC: 7 G/DL (ref 6.4–8.2)
RBC # BLD AUTO: 4.29 X10*6/UL (ref 4.5–5.9)
SODIUM SERPL-SCNC: 137 MMOL/L (ref 136–145)
WBC # BLD AUTO: 5.5 X10*3/UL (ref 4.4–11.3)

## 2024-04-29 PROCEDURE — 80053 COMPREHEN METABOLIC PANEL: CPT

## 2024-04-29 PROCEDURE — 82955 ASSAY OF G6PD ENZYME: CPT

## 2024-04-29 PROCEDURE — 36415 COLL VENOUS BLD VENIPUNCTURE: CPT

## 2024-04-29 PROCEDURE — 99204 OFFICE O/P NEW MOD 45 MIN: CPT | Performed by: INTERNAL MEDICINE

## 2024-04-29 PROCEDURE — 86140 C-REACTIVE PROTEIN: CPT

## 2024-04-29 PROCEDURE — 1036F TOBACCO NON-USER: CPT | Performed by: INTERNAL MEDICINE

## 2024-04-29 PROCEDURE — 85025 COMPLETE CBC W/AUTO DIFF WBC: CPT

## 2024-04-29 RX ORDER — PREDNISONE 5 MG/1
TABLET ORAL DAILY
Qty: 108 TABLET | Refills: 0 | Status: SHIPPED | OUTPATIENT
Start: 2024-04-29 | End: 2024-06-19

## 2024-04-29 RX ORDER — HYDROXYCHLOROQUINE SULFATE 200 MG/1
400 TABLET, FILM COATED ORAL DAILY
Qty: 60 TABLET | Refills: 5 | Status: SHIPPED | OUTPATIENT
Start: 2024-04-29 | End: 2024-10-26

## 2024-04-29 ASSESSMENT — PAIN SCALES - GENERAL: PAINLEVEL: 0-NO PAIN

## 2024-04-29 NOTE — PROGRESS NOTES
Subjective   Patient ID: [id number]   Errol Das is a 59 y.o. male who presents for New Patient Visit.  HPI    Mr. 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 He was referred by his PCP after the patient told them about a lump in the parotid gland region since beginning of 2022. the lump had grown significantly. He underwent FNAC on 4/28/22 and the path read  melanoma. Then he was taken to OR on 5/23/22 and underwent resection. Pathology report reads 1/16 positive nodes. The lone node has 6.5mm deposit with MELANIE. The parotid mass is necrotic and measures 2.3cm. Negative for BRAF V600.     PET scan (on 7/11/22) and MRI brain (7/14/22) did not show any activity concerning for tumors. We recommend adjuvant pembrolizumab. Patient consented on 7/19/22 and started Keytruda 400mg every 6 weeks on 7/26/22. He completed one year of therapy on 08/01/2023 and is currently on surveillance.      Treatment History:    Systemic treatment  1. Pembrolizumab 400mg every 6 weeks- s/p 9 cycles as of 08/01/2023  Prior  irAE's  1. Hypothyroidism (currently on Synthroid 112mcg PO daily).      PhD cancer genetics researcher by profession  Patient is an avid runner, runs ultra marathons  Has noticed bilateral knee pain from January onwards, mainly in the right knee  Was not related to activity, would feel pain at rest, along with stiffness and walking around would help  Stiffness generally less than 30 mins; he gradually noticed the swelling in the knee along with pain that was getting worse  Visited  in March, had right knee aspiration performed  That was consitent with inflammatory arthritis with negative cultures and negative crystal exam  Had knee xray performed that showed moderate effusion otherwise preserved Joint space  Labs otherwise showed normal UA levels, negative RF and ACPA.  Referred to rheumatology for further diagnostic work up  and management    Mentions he had moderate improvement after drainage, but symptoms have persisted and now gradually getting worse  He is not taking any OTC analgesia  Has recently started running again and gradually increasing his average.    Other joints are fine,   No swelling of the knuckles, difficulty making a fist  No rash, sicca symptoms, photosensitivity, dyspnea , abdominal pains  No RP , oral or genital ulcers    On levothyroxine for ICI induced hypothyroidism, otherwise not on any other long term meds  Completed ICI August/september 2023        ROS  Constitutional: Denies fever, chills, weight loss, night sweats or headaches  Eyes: Denies dry eyes, blurry vision, redness or pain or photophobia  ENT: Denies dry mouth, dental loss, loss of taste, nasal or oral ulcers, jaw claudication, difficulty swallowing, nasal crusting or recurrent sinus infections   Cardiovascular: Denies chest pain, palpitations, orthopnea  Respiratory: Denies shortness of breath, cough, asthma, or recurrent respiratory infections  Gastrointestinal: Denies dysphagia, nausea, vomiting, heartburn, abdominal pain, constipation, diarrhea, melena or hematochezia  Genitourinary: No recurrent urinary infections or STDs, no genital or anal ulcers.  Integumentary: Denies photosensitivity, rash or lesions, Raynaud's phenomenon, skin tightening, digital ulcers, psoriatic lesions, or alopecia  Neurological: Denies any numbness or tingling, muscle weakness, or incontinence   Hematologic/Lymphatic: Denies bleeding, bruising, history of clots (arterial or venous), or abortions/miscarriages/pregnancy complications   Muscular: Denies weakness, difficulty rising from chair or combing the hair, muscle aches, or problems with hand    FHx: No family history of autoimmune diseases       Patient Active Problem List   Diagnosis    Melanoma of skin (Multi)    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     Past Surgical History:   Procedure Laterality Date    OTHER SURGICAL HISTORY  10/28/2013    Corneal LASIK Bilateral    OTHER SURGICAL HISTORY  10/28/2013    Finger Tendon Lengthening    TONSILLECTOMY  10/28/2013    Tonsillectomy        Social History     Socioeconomic History    Marital status:      Spouse name: Not on file    Number of children: Not on file    Years of education: Not on file    Highest education level: Not on file   Occupational History    Not on file   Tobacco Use    Smoking status: Never    Smokeless tobacco: Never   Substance and Sexual Activity    Alcohol use: Yes     Alcohol/week: 7.0 standard drinks of alcohol     Types: 7 Cans of beer per week     Comment: in a week    Drug use: Never    Sexual activity: Not on file   Other Topics Concern    Not on file   Social History Narrative    Not on file     Social Determinants of Health     Financial Resource Strain: Not on file   Food Insecurity: Not on file   Transportation Needs: Not on file   Physical Activity: Not on file   Stress: Not on file   Social Connections: Not on file   Intimate Partner Violence: Not on file   Housing Stability: Not on file        No Known Allergies       Current Outpatient Medications:     levothyroxine (Synthroid, Levoxyl) 112 mcg tablet, Take 1 tablet (112 mcg) by mouth once daily in the morning. Take before meals., Disp: 30 tablet, Rfl: 11    hydroxychloroquine (Plaquenil) 200 mg tablet, Take 2 tablets (400 mg) by mouth once daily., Disp: 60 tablet, Rfl: 5    predniSONE (Deltasone) 5 mg tablet, Take 4 tablets (20 mg) by mouth once daily for 7 days, THEN 3 tablets (15 mg) once daily for 7 days, THEN 2 tablets (10 mg) once daily for 7 days, THEN 1.5 tablets (7.5 mg) once daily., Disp: 108 tablet, Rfl: 0       Objective     Visit Vitals  /67   Pulse 58        Physical Exam  General: AAOx3, Cooperative  Head: normocephalic, atraumatic  Eyes: EOMI, conjunctiva clear,  sclera white, anicteric  Ears: no redness, swelling, tenderness  Nose: no deformity, no crusting   Throat/Mouth: No oral deformities, no cheek swelling, mucosa appear moist, no oral ulcers noted or loss of dentition   Neck/Lymph: FROM, trachea midline  Lungs: chest expansion symmetric. No respiratory distress.   Heart: RRR  Neuro: CN II-XII grossly intact, no focal deficit  Skin: No rashes, ulcers or photosensitive areas  MSK: Upper Extremities:  Hand/Fingers: No erythema, swelling, tenderness or warmth at DIP, PIP, or MCP joints, FROM grossly. Good hand . No nodules. No deformities   Wrists: No erythema, swelling, warmth or tenderness at wrist, FROM grossly  Elbows: No tenderness, swelling, erythema or warmth at elbows, FROM grossly. No nodules   Shoulders: No swelling, erythema, tenderness or warmth at shoulders. FROM  Lower Extremities:   Hips: No obvious deformities. No joint tenderness, normal ROM grossly. Log roll test negative bilaterally. Selena test is negative bilaterally. No trochanteric bursae TTP  Knees: bilateral warm knees with moderate effusion with synovial hypertrophy in the right knee  With minimal tenderness over the medial suprapatellar aspect    -Bedside ultrasound shows moderate effusion , no evidence of enthesitis at the quadriceps and patellar tendon insertion sites  -Lateral and medial meniscus wo any defects on ultrasound    No crepitus, no pes anserine bursa TTP   Ankles: No deformities, tenderness, edema, erythema, ulceration, or warmth at the ankle  Feet: Negative MTP squeeze. Normal ROM grossly.   Spine: No spinal tenderness to palpation. No SI joint tenderness. Gaenslen test negative       [unfilled]      Lab Results   Component Value Date    WBC 8.1 03/14/2024    HGB 13.7 03/14/2024    HCT 40.9 (L) 03/14/2024    MCV 91 03/14/2024     03/14/2024        Chemistry    Lab Results   Component Value Date/Time     03/14/2024 1117    K 4.2 03/14/2024 1117      "03/14/2024 1117    CO2 28 03/14/2024 1117    BUN 14 03/14/2024 1117    CREATININE 0.69 03/14/2024 1117    Lab Results   Component Value Date/Time    CALCIUM 9.5 03/14/2024 1117    ALKPHOS 67 03/14/2024 1117    AST 14 03/14/2024 1117    ALT 8 (L) 03/14/2024 1117    BILITOT 0.4 03/14/2024 1117           Lab Results   Component Value Date    CRP 1.47 (H) 03/14/2024      Lab Results   Component Value Date    RF <10 03/14/2024    SEDRATE 27 (H) 03/14/2024      No results found for: \"CKTOTAL\"  Lab Results   Component Value Date    NEUTROABS 4.64 03/14/2024      No results found for: \"FERRITIN\"   Lab Results   Component Value Date    HEPBCIGM NONREACTIVE 07/19/2022    HEPCAB NONREACTIVE 07/19/2022      Lab Results   Component Value Date    ALT 8 (L) 03/14/2024    AST 14 03/14/2024    ALKPHOS 67 03/14/2024    BILITOT 0.4 03/14/2024      No results found for: \"PPD\"   Lab Results   Component Value Date    URICACID 4.2 03/14/2024      Lab Results   Component Value Date    CALCIUM 9.5 03/14/2024      No results found for: \"SPEP\", \"UPEP\"   No results found for: \"ALBUR\", \"NIO27XLR\"   .last          CT chest abdomen pelvis w IV contrast  Narrative: Interpreted By:  Berny Keller,   STUDY:  CT CHEST ABDOMEN PELVIS W IV CONTRAST; 3/15/2024 12:18 pm      INDICATION:  Signs/Symptoms:Melanoma metastatic to the parotid. Completed 9 cycles  of immunotherapy.      COMPARISON:  12/15/2023      ACCESSION NUMBER(S):  [id number]      ORDERING CLINICIAN:  TYSON GUADALUPE      TECHNIQUE:  CT of the chest, abdomen, and pelvis was performed.  Contiguous axial  images were obtained through the chest, abdomen and pelvis. Coronal  and sagittal reconstructions were also created.      120 ml of contrast Omnipaque 350 was administered intravenously  without immediate complication.      FINDINGS:  CARDIOVASCULAR:  Heart size is within normal limits. The thoracic aorta is not  aneurysmal.      MEDIASTINUM, LYLE AND LYMPH NODES:  No adenopathy " is evident.      PLEURA AND PERICARDIUM:  Unremarkable.      CENTRAL AIRWAYS AND PULMONARY:  No consolidation or mass. A couple subpleural areas of  reticulonodularity are stable, likely postinflammatory.      LIVER:  A 5 mm too small to characterize hypodense focus in hepatic segment  II superiorly is stable to 05/11/2022, likely benign in etiology,  such as a cyst or hemangioma. No new hepatic abnormality is evident.      BILE DUCTS:  No biliary dilation.      GALLBLADDER:  Unremarkable.      PANCREAS:  Unremarkable.      SPLEEN:  Unremarkable.      ADRENAL GLANDS:  Unremarkable.      KIDNEYS, URETERS AND BLADDER:  Unremarkable.      REPRODUCTIVE ORGANS:  Unremarkable.      BOWEL:  No evidence of obstruction or inflammation. The appendix is within  normal limits.      VESSELS:  A few focal areas of calcific atherosclerosis are present. The  abdominal aorta is not aneurysmal.      PERITONEUM/RETROPERITONEUM/LYMPH NODES:  No free fluid or free air. No adenopathy is evident.      MUSCULOSKELETAL:  No destructive osseous lesion is evident. There are stable linear  infiltration in the subcutaneous fat of the anterior right lower  quadrant.      Impression: 1. Stable findings without evidence of disease.      Signed by: Berny Keller 3/18/2024 10:47 AM  Dictation workstation:   [id number]     === 01/23/24 ===    XR KNEE 1-2 VIEWS RIGHT    - Impression -  Nonspecific right knee joint effusion. No acute osseous abnormality.    Signed by: Luke Johns 1/23/2024 3:44 PM  Dictation workstation:   [id number]   === 07/14/22 ===    MR BRAIN W AND WO CONTRAST    - Impression -  1. No evidence of intracranial metastatic disease.  2. Moderate paranasal sinus mucosal thickening with bilateral  maxillary sinus air-fluid levels, correlate with concern for  sinusitis.    I personally reviewed the images/study and I agree with the findings  as stated. This study was interpreted at [facility],  [place], [place].          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  Bilateral knee ( R>L)  Completed ICI therapy - September 2023  Seronegative status, no s/s for PsA at this time  Gout was ruled out, has no real risk factors and imaging did not show chondrocalcinosis and US MaxFLEX view did not show evidence of such as well ( also he is 59 wo any other know metabolic risk factors for CPPD)    B/C negative status  Quantiferon not needed at this time  G6PD status for HCQ ordered    Will proceed with prednisone as outlined below and addition of HCQ   Also spoke about possible use of methotrexate if continues to have chronic inflammatory arthritis  Re-evaluate in 8 weeks  If in remission, will taper steroids off and continue HCQ  Otherwise will consider addition of methotrexate with future prednisone downtitration    -     predniSONE (Deltasone) 5 mg tablet; Take 4 tablets (20 mg) by mouth once daily for 7 days, THEN 3 tablets (15 mg) once daily for 7 days, THEN 2 tablets (10 mg) once daily for 7 days, THEN 1.5 tablets (7.5 mg) once daily.  -     hydroxychloroquine (Plaquenil) 200 mg tablet; Take 2 tablets (400 mg) by mouth once daily.  -     C-Reactive Protein; Future  -     Glucose-6-Phosphate Dehydrogenase, Quantitiative; Future  -     Comprehensive Metabolic Panel; Future  -     CBC and Auto Differential; Future       Seen and discussed with Dr Jonel Babcock MD   Plan, including risks and benefits, was discussed with the patient, informed on how to reach us.     To schedule an appointment, call [phone]

## 2024-04-29 NOTE — PATIENT INSTRUCTIONS
We are starting you on two new medicines    Prednisone course - see prescription for details    And Hydroxycholoroquine 400mg daily    Please do the blood tests today  Follow up x 8 weeks

## 2024-05-02 LAB — G6PD RBC-CCNT: 12.1 U/G HB (ref 9.9–16.6)

## 2024-06-14 ASSESSMENT — DERMATOLOGY QUALITY OF LIFE (QOL) ASSESSMENT
WHAT SINGLE SKIN CONDITION LISTED BELOW IS THE PATIENT ANSWERING THE QUALITY-OF-LIFE ASSESSMENT QUESTIONS ABOUT: NONE OF THE ABOVE
RATE HOW BOTHERED YOU ARE BY EFFECTS OF YOUR SKIN PROBLEMS ON YOUR ACTIVITIES (EG, GOING OUT, ACCOMPLISHING WHAT YOU WANT, WORK ACTIVITIES OR YOUR RELATIONSHIPS WITH OTHERS): 0 - NEVER BOTHERED
RATE HOW BOTHERED YOU ARE BY EFFECTS OF YOUR SKIN PROBLEMS ON YOUR ACTIVITIES (EG, GOING OUT, ACCOMPLISHING WHAT YOU WANT, WORK ACTIVITIES OR YOUR RELATIONSHIPS WITH OTHERS): 0 - NEVER BOTHERED
RATE HOW EMOTIONALLY BOTHERED YOU ARE BY YOUR SKIN PROBLEM (FOR EXAMPLE, WORRY, EMBARRASSMENT, FRUSTRATION): 0 - NEVER BOTHERED
RATE HOW EMOTIONALLY BOTHERED YOU ARE BY YOUR SKIN PROBLEM (FOR EXAMPLE, WORRY, EMBARRASSMENT, FRUSTRATION): 0 - NEVER BOTHERED
WHAT SINGLE SKIN CONDITION LISTED BELOW IS THE PATIENT ANSWERING THE QUALITY-OF-LIFE ASSESSMENT QUESTIONS ABOUT: NONE OF THE ABOVE
RATE HOW BOTHERED YOU ARE BY SYMPTOMS OF YOUR SKIN PROBLEM (EG, ITCHING, STINGING BURNING, HURTING OR SKIN IRRITATION): 0 - NEVER BOTHERED
RATE HOW BOTHERED YOU ARE BY SYMPTOMS OF YOUR SKIN PROBLEM (EG, ITCHING, STINGING BURNING, HURTING OR SKIN IRRITATION): 0 - NEVER BOTHERED

## 2024-06-14 ASSESSMENT — PATIENT GLOBAL ASSESSMENT (PGA): WHAT IS THE PGA: PATIENT GLOBAL ASSESSMENT:  1 - CLEAR

## 2024-06-20 ENCOUNTER — LAB (OUTPATIENT)
Dept: LAB | Facility: HOSPITAL | Age: 59
End: 2024-06-20
Payer: COMMERCIAL

## 2024-06-20 ENCOUNTER — HOSPITAL ENCOUNTER (OUTPATIENT)
Dept: RADIOLOGY | Facility: HOSPITAL | Age: 59
Discharge: HOME | End: 2024-06-20
Payer: COMMERCIAL

## 2024-06-20 DIAGNOSIS — C79.89 METASTATIC MELANOMA TO PAROTID GLAND (MULTI): ICD-10-CM

## 2024-06-20 DIAGNOSIS — E03.9 HYPOTHYROIDISM, UNSPECIFIED TYPE: ICD-10-CM

## 2024-06-20 LAB
ALBUMIN SERPL BCP-MCNC: 4.6 G/DL (ref 3.4–5)
ALP SERPL-CCNC: 55 U/L (ref 33–120)
ALT SERPL W P-5'-P-CCNC: 14 U/L (ref 10–52)
ANION GAP SERPL CALC-SCNC: 13 MMOL/L (ref 10–20)
AST SERPL W P-5'-P-CCNC: 19 U/L (ref 9–39)
BASOPHILS # BLD AUTO: 0.05 X10*3/UL (ref 0–0.1)
BASOPHILS NFR BLD AUTO: 1 %
BILIRUB SERPL-MCNC: 0.5 MG/DL (ref 0–1.2)
BUN SERPL-MCNC: 19 MG/DL (ref 6–23)
CALCIUM SERPL-MCNC: 9.3 MG/DL (ref 8.6–10.3)
CHLORIDE SERPL-SCNC: 98 MMOL/L (ref 98–107)
CO2 SERPL-SCNC: 28 MMOL/L (ref 21–32)
CREAT SERPL-MCNC: 0.86 MG/DL (ref 0.5–1.3)
EGFRCR SERPLBLD CKD-EPI 2021: >90 ML/MIN/1.73M*2
EOSINOPHIL # BLD AUTO: 0.39 X10*3/UL (ref 0–0.7)
EOSINOPHIL NFR BLD AUTO: 7.4 %
ERYTHROCYTE [DISTWIDTH] IN BLOOD BY AUTOMATED COUNT: 13.8 % (ref 11.5–14.5)
GLUCOSE SERPL-MCNC: 85 MG/DL (ref 74–99)
HCT VFR BLD AUTO: 40.2 % (ref 41–52)
HGB BLD-MCNC: 13.5 G/DL (ref 13.5–17.5)
IMM GRANULOCYTES # BLD AUTO: 0.02 X10*3/UL (ref 0–0.7)
IMM GRANULOCYTES NFR BLD AUTO: 0.4 % (ref 0–0.9)
LDH SERPL L TO P-CCNC: 161 U/L (ref 84–246)
LYMPHOCYTES # BLD AUTO: 0.93 X10*3/UL (ref 1.2–4.8)
LYMPHOCYTES NFR BLD AUTO: 17.7 %
MCH RBC QN AUTO: 31.5 PG (ref 26–34)
MCHC RBC AUTO-ENTMCNC: 33.6 G/DL (ref 32–36)
MCV RBC AUTO: 94 FL (ref 80–100)
MONOCYTES # BLD AUTO: 0.64 X10*3/UL (ref 0.1–1)
MONOCYTES NFR BLD AUTO: 12.2 %
NEUTROPHILS # BLD AUTO: 3.21 X10*3/UL (ref 1.2–7.7)
NEUTROPHILS NFR BLD AUTO: 61.3 %
NRBC BLD-RTO: 0 /100 WBCS (ref 0–0)
PLATELET # BLD AUTO: 195 X10*3/UL (ref 150–450)
POTASSIUM SERPL-SCNC: 4.3 MMOL/L (ref 3.5–5.3)
PROT SERPL-MCNC: 6.9 G/DL (ref 6.4–8.2)
RBC # BLD AUTO: 4.29 X10*6/UL (ref 4.5–5.9)
SODIUM SERPL-SCNC: 135 MMOL/L (ref 136–145)
TSH SERPL-ACNC: 2.41 MIU/L (ref 0.44–3.98)
WBC # BLD AUTO: 5.2 X10*3/UL (ref 4.4–11.3)

## 2024-06-20 PROCEDURE — 85025 COMPLETE CBC W/AUTO DIFF WBC: CPT

## 2024-06-20 PROCEDURE — 74177 CT ABD & PELVIS W/CONTRAST: CPT

## 2024-06-20 PROCEDURE — 36415 COLL VENOUS BLD VENIPUNCTURE: CPT

## 2024-06-20 PROCEDURE — 2550000001 HC RX 255 CONTRASTS: Performed by: NURSE PRACTITIONER

## 2024-06-20 PROCEDURE — 70491 CT SOFT TISSUE NECK W/DYE: CPT

## 2024-06-20 PROCEDURE — 80053 COMPREHEN METABOLIC PANEL: CPT

## 2024-06-20 PROCEDURE — 83615 LACTATE (LD) (LDH) ENZYME: CPT

## 2024-06-20 PROCEDURE — 84443 ASSAY THYROID STIM HORMONE: CPT

## 2024-06-21 ENCOUNTER — APPOINTMENT (OUTPATIENT)
Dept: DERMATOLOGY | Facility: CLINIC | Age: 59
End: 2024-06-21
Payer: COMMERCIAL

## 2024-06-21 DIAGNOSIS — L82.1 SEBORRHEIC KERATOSES: ICD-10-CM

## 2024-06-21 DIAGNOSIS — L57.8 SUN-DAMAGED SKIN: ICD-10-CM

## 2024-06-21 DIAGNOSIS — Z85.820 PERSONAL HISTORY OF MALIGNANT MELANOMA: ICD-10-CM

## 2024-06-21 DIAGNOSIS — D22.9 MULTIPLE BENIGN MELANOCYTIC NEVI: Primary | ICD-10-CM

## 2024-06-21 DIAGNOSIS — L90.5 SCAR CONDITIONS AND FIBROSIS OF SKIN: ICD-10-CM

## 2024-06-21 DIAGNOSIS — D18.01 CHERRY ANGIOMA: ICD-10-CM

## 2024-06-21 DIAGNOSIS — Z85.828 HISTORY OF NONMELANOMA SKIN CANCER: ICD-10-CM

## 2024-06-21 DIAGNOSIS — L91.8 SKIN TAG: ICD-10-CM

## 2024-06-21 PROCEDURE — 99213 OFFICE O/P EST LOW 20 MIN: CPT | Performed by: DERMATOLOGY

## 2024-06-21 NOTE — PROGRESS NOTES
Subjective     Errol Das is a 59 y.o. male who presents for the following: Skin Check.     Review of Systems:  No other skin or systemic complaints other than what is documented elsewhere in the note.    The following portions of the chart were reviewed this encounter and updated as appropriate:          Skin Cancer History  - 04/2023, right neck, infiltrative and nodular BCC, s/p Mohs with Dr. 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 Year  Location: Right parotid cheek. Treatment(s): pembrolizumab Pathology: [id number]  - reports melanoma in situ, s/p excision with Dr. Poe, 15+ years ago      Specialty Problems          Dermatology Problems    Melanoma of skin (Multi)        Objective   Well appearing patient in no apparent distress; mood and affect are within normal limits.    A full skin examination was performed. All findings within normal limits unless otherwise noted below.    Assessment/Plan   1. Multiple benign melanocytic nevi  - scattered regular brown macules and papules    Benign melanocytic nevi  - Discussed benign nature and that no treatment is necessary unless it becomes painful or increases in size. Patient opts for clinical monitoring at this time.    - Sun protective behavior reviewed and encouraged including the use of over-the-counter sunscreen with SPF30+ daily (reapply every 1.5 hours when outdoors), UPF clothing, broad rimmed hats, sunglasses, and avoidance of midday sun. Home skin monitoring encouraged and how to monitor for skin cancer (changing or new moles, new rapidly growing or non-healing lesions) reviewed. Patient encouraged to call with interval concerns or changes.      2. Cherry angioma  - scattered small bright red papules and macules    Cherry angioma(s)  - Discussed benign nature and that no treatment is necessary unless it becomes painful or increases in size. Patient opts for clinical monitoring at this time.      3.  Seborrheic keratoses  - Scattered waxy tan/grey/brown papules with horn cysts    Seborrheic keratosis (-es)  - Discussed benign nature and that no treatment is necessary unless it becomes painful or increases in size. Patient opts for clinical monitoring at this time.      4. Skin tag  - fleshy pedunculated papule(s)    Skin tags  - Discussed benign nature and that no treatment is necessary unless it becomes painful or increases in size. Patient opts for clinical monitoring at this time.      5. Sun-damaged skin  - scattered tan macules, telangiectasias, and general photo-damage    Actinically damaged skin-  - Sun protective behavior reviewed and encouraged including the use of over-the-counter sunscreen with SPF30+ daily (reapply every 1.5 hours when outdoors), UPF clothing, broad rimmed hats, sunglasses, and avoidance of midday sun. Home skin monitoring encouraged and how to monitor for skin cancer (changing or new moles, new rapidly growing or non-healing lesions) reviewed. Patient encouraged to call with interval concerns or changes.      6. Scar conditions and fibrosis of skin  - Well healed scar at prior treatment sites without visual or palpable evidence of recurrence.     - Well healed scar(s) at sites of prior skin cancer.  - Sun protective behavior reviewed and encouraged including the use of over-the-counter sunscreen with SPF30+ daily (reapply every 1.5 hours when outdoors), UPF clothing, broad rimmed hats, sunglasses, and avoidance of midday sun. Home skin monitoring encouraged and how to monitor for skin cancer (changing or new moles, new rapidly growing or non-healing lesions) reviewed. Patient encouraged to call with interval concerns or changes.       7. Personal history of malignant melanoma    Related Procedures  Follow Up In Dermatology - Established Patient  Follow Up In Dermatology - Established Patient    8. History of nonmelanoma skin cancer      RTC 6 months   Lissa Rodarte MD     I saw and  evaluated the patient. I personally obtained the key and critical portions of the history and physical exam or was physically present for key and critical portions performed by the resident/fellow. I reviewed the resident/fellow's documentation and discussed the patient with the resident/fellow. I agree with the resident/fellow's medical decision making as documented in the note.    Justino Nieto MD PhD         room air

## 2024-06-24 ENCOUNTER — APPOINTMENT (OUTPATIENT)
Dept: RHEUMATOLOGY | Facility: CLINIC | Age: 59
End: 2024-06-24
Payer: COMMERCIAL

## 2024-06-24 VITALS
DIASTOLIC BLOOD PRESSURE: 66 MMHG | BODY MASS INDEX: 25.16 KG/M2 | HEART RATE: 60 BPM | HEIGHT: 68 IN | WEIGHT: 166 LBS | SYSTOLIC BLOOD PRESSURE: 105 MMHG | TEMPERATURE: 98 F

## 2024-06-24 DIAGNOSIS — M25.561 CHRONIC PAIN OF RIGHT KNEE: Primary | ICD-10-CM

## 2024-06-24 DIAGNOSIS — M19.90 INFLAMMATORY ARTHRITIS: ICD-10-CM

## 2024-06-24 DIAGNOSIS — M13.80 SERONEGATIVE ARTHRITIS: ICD-10-CM

## 2024-06-24 DIAGNOSIS — Z92.89 HISTORY OF IMMUNOTHERAPY: ICD-10-CM

## 2024-06-24 DIAGNOSIS — G89.29 CHRONIC PAIN OF RIGHT KNEE: Primary | ICD-10-CM

## 2024-06-24 PROCEDURE — 99214 OFFICE O/P EST MOD 30 MIN: CPT | Performed by: INTERNAL MEDICINE

## 2024-06-24 ASSESSMENT — PAIN SCALES - GENERAL: PAINLEVEL: 0-NO PAIN

## 2024-06-24 NOTE — PATIENT INSTRUCTIONS
Continue taking the HCQ tablet daily    Please see an eye doctor for a baseline visit while you are on HCQ therapy.    If you have a flare of arthritis, please reach out to us. [phone]    Follow up in 4-5 months

## 2024-06-24 NOTE — PROGRESS NOTES
Subjective   Patient ID: [id number]   Errol Das is a 59 y.o. male who presents for Follow-up.  HPI    Mr. 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 He was referred by his PCP after the patient told them about a lump in the parotid gland region since beginning of 2022. the lump had grown significantly. He underwent FNAC on 4/28/22 and the path read  melanoma. Then he was taken to OR on 5/23/22 and underwent resection. Pathology report reads 1/16 positive nodes. The lone node has 6.5mm deposit with MELANIE. The parotid mass is necrotic and measures 2.3cm. Negative for BRAF V600.     PET scan (on 7/11/22) and MRI brain (7/14/22) did not show any activity concerning for tumors. We recommend adjuvant pembrolizumab. Patient consented on 7/19/22 and started Keytruda 400mg every 6 weeks on 7/26/22. He completed one year of therapy on 08/01/2023 and is currently on surveillance.      Treatment History:    Systemic treatment  1. Pembrolizumab 400mg every 6 weeks- s/p 9 cycles as of 08/01/2023  Prior  irAE's  1. Hypothyroidism (currently on Synthroid 112mcg PO daily).      PhD cancer genetics researcher by profession  Patient is an avid runner, runs ultra marathons  Has noticed bilateral knee pain from January onwards, mainly in the right knee  Was not related to activity, would feel pain at rest, along with stiffness and walking around would help  Stiffness generally less than 30 mins; he gradually noticed the swelling in the knee along with pain that was getting worse  Visited  in March, had right knee aspiration performed  That was consitent with inflammatory arthritis with negative cultures and negative crystal exam  Had knee xray performed that showed moderate effusion otherwise preserved Joint space  Labs otherwise showed normal UA levels, negative RF and ACPA.  Referred to rheumatology for further diagnostic work up and  management    Mentions he had moderate improvement after drainage, but symptoms have persisted and now gradually getting worse  He is not taking any OTC analgesia  Has recently started running again and gradually increasing his average.    Other joints are fine,   No swelling of the knuckles, difficulty making a fist  No rash, sicca symptoms, photosensitivity, dyspnea , abdominal pains  No RP , oral or genital ulcers    On levothyroxine for ICI induced hypothyroidism, otherwise not on any other long term meds  Completed ICI August/september 2023    Interval History   Doing a lot better, no swelling or stiffness of the knee  Preparing for an ultra marathon without any issues  Other joints are fine  No rash  No s/e from [facility]  Had surveillance imaging for melanoma, stable radiographically.    ROS  Constitutional: Denies fever, chills, weight loss, night sweats or headaches  Eyes: Denies dry eyes, blurry vision, redness or pain or photophobia  ENT: Denies dry mouth, dental loss, loss of taste, nasal or oral ulcers, jaw claudication, difficulty swallowing, nasal crusting or recurrent sinus infections   Cardiovascular: Denies chest pain, palpitations, orthopnea  Respiratory: Denies shortness of breath, cough, asthma, or recurrent respiratory infections  Gastrointestinal: Denies dysphagia, nausea, vomiting, heartburn, abdominal pain, constipation, diarrhea, melena or hematochezia  Genitourinary: No recurrent urinary infections or STDs, no genital or anal ulcers.  Integumentary: Denies photosensitivity, rash or lesions, Raynaud's phenomenon, skin tightening, digital ulcers, psoriatic lesions, or alopecia  Neurological: Denies any numbness or tingling, muscle weakness, or incontinence   Hematologic/Lymphatic: Denies bleeding, bruising, history of clots (arterial or venous), or abortions/miscarriages/pregnancy complications   Muscular: Denies weakness, difficulty rising from chair or combing the hair, muscle aches, or problems  with hand    FHx: No family history of autoimmune diseases       Patient Active Problem List   Diagnosis    Melanoma of skin (Multi)    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 No pertinent past medical history.     Past Surgical History:   Procedure Laterality Date    OTHER SURGICAL HISTORY  10/28/2013    Corneal LASIK Bilateral    OTHER SURGICAL HISTORY  10/28/2013    Finger Tendon Lengthening    TONSILLECTOMY  10/28/2013    Tonsillectomy        Social History     Socioeconomic History    Marital status:      Spouse name: Not on file    Number of children: Not on file    Years of education: Not on file    Highest education level: Not on file   Occupational History    Not on file   Tobacco Use    Smoking status: Never     Passive exposure: Never    Smokeless tobacco: Never   Substance and Sexual Activity    Alcohol use: Yes     Alcohol/week: 7.0 standard drinks of alcohol     Types: 7 Cans of beer per week     Comment: in a week    Drug use: Never    Sexual activity: Not on file   Other Topics Concern    Not on file   Social History Narrative    Not on file     Social Determinants of Health     Financial Resource Strain: Not on file   Food Insecurity: Not on file   Transportation Needs: Not on file   Physical Activity: Not on file   Stress: Not on file   Social Connections: Not on file   Intimate Partner Violence: Not on file   Housing Stability: Not on file        No Known Allergies       Current Outpatient Medications:     hydroxychloroquine (Plaquenil) 200 mg tablet, Take 2 tablets (400 mg) by mouth once daily., Disp: 60 tablet, Rfl: 5    levothyroxine (Synthroid, Levoxyl) 112 mcg tablet, Take 1 tablet (112 mcg) by mouth once daily in the morning. Take before meals., Disp: 30 tablet, Rfl: 11       Objective     Visit Vitals  /66   Pulse 60   Temp 36.7 °C (98 °F)         Physical Exam  General: AAOx3, Cooperative  Head: normocephalic, atraumatic  Eyes: EOMI, conjunctiva clear, sclera white, anicteric  Ears: no redness, swelling, tenderness  Nose: no deformity, no crusting   Throat/Mouth: No oral deformities, no cheek swelling, mucosa appear moist, no oral ulcers noted or loss of dentition   Neck/Lymph: FROM, trachea midline  Lungs: chest expansion symmetric. No respiratory distress.   Heart: RRR  Neuro: CN II-XII grossly intact, no focal deficit  Skin: No rashes, ulcers or photosensitive areas  MSK: Upper Extremities:  Hand/Fingers: No erythema, swelling, tenderness or warmth at DIP, PIP, or MCP joints, FROM grossly. Good hand . No nodules. No deformities   Wrists: No erythema, swelling, warmth or tenderness at wrist, FROM grossly  Elbows: No tenderness, swelling, erythema or warmth at elbows, FROM grossly. No nodules   Shoulders: No swelling, erythema, tenderness or warmth at shoulders. FROM  Lower Extremities:   Hips: No obvious deformities. No joint tenderness, normal ROM grossly. Log roll test negative bilaterally. Selena test is negative bilaterally. No trochanteric bursae TTP  Knees: previously 4/2024  bilateral warm knees with moderate effusion with synovial hypertrophy in the right knee  With minimal tenderness over the medial suprapatellar aspect  -Bedside ultrasound shows moderate effusion , no evidence of enthesitis at the quadriceps and patellar tendon insertion sites  -Lateral and medial meniscus wo any defects on ultrasound    6/24   Normal knee exam    //No crepitus, no pes anserine bursa TTP    Ankles: No deformities, tenderness, edema, erythema, ulceration, or warmth at the ankle  Feet: Negative MTP squeeze. Normal ROM grossly.   Spine: No spinal tenderness to palpation. No SI joint tenderness. Gaenslen test negative       [unfilled]      Lab Results   Component Value Date    WBC 5.2 06/20/2024    HGB 13.5 06/20/2024    HCT 40.2 (L) 06/20/2024    MCV 94  "06/20/2024     06/20/2024        Chemistry    Lab Results   Component Value Date/Time     (L) 06/20/2024 1147    K 4.3 06/20/2024 1147    CL 98 06/20/2024 1147    CO2 28 06/20/2024 1147    BUN 19 06/20/2024 1147    CREATININE 0.86 06/20/2024 1147    Lab Results   Component Value Date/Time    CALCIUM 9.3 06/20/2024 1147    ALKPHOS 55 06/20/2024 1147    AST 19 06/20/2024 1147    ALT 14 06/20/2024 1147    BILITOT 0.5 06/20/2024 1147           Lab Results   Component Value Date    CRP 0.25 04/29/2024      Lab Results   Component Value Date    RF <10 03/14/2024    SEDRATE 27 (H) 03/14/2024      No results found for: \"CKTOTAL\"  Lab Results   Component Value Date    NEUTROABS 3.21 06/20/2024      No results found for: \"FERRITIN\"   Lab Results   Component Value Date    HEPBCIGM NONREACTIVE 07/19/2022    HEPCAB NONREACTIVE 07/19/2022      Lab Results   Component Value Date    ALT 14 06/20/2024    AST 19 06/20/2024    ALKPHOS 55 06/20/2024    BILITOT 0.5 06/20/2024      No results found for: \"PPD\"   Lab Results   Component Value Date    URICACID 4.2 03/14/2024      Lab Results   Component Value Date    CALCIUM 9.3 06/20/2024      No results found for: \"SPEP\", \"UPEP\"   No results found for: \"ALBUR\", \"ULH30PFW\"   .last          CT chest abdomen pelvis w IV contrast  Narrative: Interpreted By:  Naresh Maldonado and Jiang Sirui   STUDY:  CT CHEST ABDOMEN PELVIS W IV CONTRAST;  6/20/2024 11:40 am      INDICATION:  Signs/Symptoms:Melaoma of the parotid currently on surveillance.      Per clinical notes: 59-year-old male with history of right parotid  gland melanoma status post resection in 2022 and adjuvant  chemotherapy. Currently on surveillance.      COMPARISON:  Multiple prior CTs most recent dated 03/15/2024      ACCESSION NUMBER(S):  [id number]      ORDERING CLINICIAN:  TYSON GUADALUPE      TECHNIQUE:  CT of the chest, abdomen, and pelvis was performed.  Contiguous axial  images were obtained at 3 mm " slice thickness through the chest,  abdomen and pelvis. Coronal and sagittal reconstructions at 3 mm  slice thickness were performed.  125 ml of contrast Omnipaque 350 were administered intravenously  without immediate complication.      FINDINGS:  CHEST:      LUNG/PLEURA/LARGE AIRWAYS:  The trachea and central airways are patent. No endobronchial lesion.  No consolidation, pleural effusion, or pneumothorax. There is an  unchanged 0.3 cm pulmonary nodule of the right upper lobe (series 3,  image 127). There are no new suspicious or enlarging pulmonary  nodules. There is mild bibasilar atelectasis.      VESSELS:  Aorta and pulmonary arteries are normal caliber.  No atherosclerotic  changes of the aorta are identified.  No coronary artery  calcifications are present.      HEART:  The heart is normal in size.  No pericardial effusion      MEDIASTINUM AND LYLE:  No mediastinal, hilar or axillary lymphadenopathy is present.  The  esophagus is normal.      CHEST WALL AND LOWER NECK:  The soft tissues of the chest wall demonstrate no gross abnormality.  The thyroid gland is atrophic.      ABDOMEN:      LIVER:  The liver is normal in size. There is an unchanged 0.4 cm hypodense  lesion of hepatic segment 6 (series 2, image 127). No new worrisome  hepatic lesions.      BILE DUCTS:  The intrahepatic and extrahepatic ducts are not dilated.      GALLBLADDER:  No calcified stones. No wall thickening.      PANCREAS:  The pancreas appears unremarkable without evidence of ductal  dilatation or masses.      SPLEEN:  The spleen is normal in size.      ADRENAL GLANDS:  Bilateral adrenal glands appear normal.      KIDNEYS AND URETERS:  The kidneys are normal in size and enhance symmetrically.  No  hydroureteronephrosis or nephroureterolithiasis is identified.      PELVIS:      BLADDER:  The urinary bladder appears normal without abnormal wall thickening.      REPRODUCTIVE ORGANS:  The prostate is not enlarged.      BOWEL:  The stomach  is distended with a sharp decompression at the 2nd  portion of the duodenum as best seen on series 2, image 105 which is  likely secondary to fluid ingestion. Otherwise, the small and large  bowel demonstrate no abnormal bowel thickening or dilatation. The  appendix appears normal.          VESSELS:  There is no aneurysmal dilatation of the abdominal aorta. The IVC  appears normal. The portal venous vasculature is patent.      PERITONEUM/RETROPERITONEUM/LYMPH NODES:  No ascites or free air, no fluid collection.  No abdominopelvic  lymphadenopathy is present.      BONE AND SOFT TISSUE:  No suspicious osseous lesions are identified. Degenerative discogenic  disease is noted in the lower thoracic and lumbar spine.  Anterolisthesis of L5 on S1 with bilateral chronic L5 pars defects.  There is a small fat containing umbilical hernia.      Impression: Melanoma restaging scan. When compared to the prior examination dated  03/15/2024, there has been no significant interval change and no  definite evidence of new malignancy. Additional stable chronic  incidental findings described above.      I personally reviewed the image(s) / study and I agree with the  findings as stated by Cortney Servin MD. This study was interpreted at  [place], [place].      MACRO:  None      Signed by: Naresh Maldonado 6/20/2024 3:34 PM  Dictation workstation:   [id number]  CT soft tissue neck w IV contrast  Narrative: Interpreted By:  Calvin Serrano,   STUDY:  CT SOFT TISSUE NECK W IV CONTRAST;  6/20/2024 11:43 am      INDICATION:  Signs/Symptoms:melanoma of the parotid s/p resection and  immunotherapy, currently on surveillance.      COMPARISON:  None.      ACCESSION NUMBER(S):  [id number]      ORDERING CLINICIAN:  TYSON GUADALUPE      TECHNIQUE:  Axial CT images of the neck were obtained.  The patient received 125  ML of Omnipaque 350 intravenous contrast agent. The images were  reformatted in angled axial, coronal  and sagittal planes.      FINDINGS:  Oral Cavity, Pharynx and Larynx:  Evaluation oral cavity is limited  by streak artifact from dental hardware.  The nasopharyngeal and  oropharyngeal structures are unremarkable. The hypopharyngeal and  laryngeal structures are unremarkable.      Retropharyngeal and Prevertebral Soft Tissues: Unremarkable.      Lymph nodes: There are few non specific bilateral neck nodes,  probably reactive in etiology.      Neck vessels: Bilateral neck vessels are normal in course and caliber  and appear patent.      Thyroid gland: Overall small in size, unchanged.      Parotid and submandibular glands: There are stable postoperative  changes from resection of the right parotid gland. Stable appearance  of the surgical field. Small amount of soft tissue located within the  surgical bed is most consistent with residual parotid gland. Left  parotid gland and submandibular glands are unremarkable in appearance.      Paranasal Sinuses and Mastoids: There is moderate mucosal thickening  located within the maxillary sinuses and mild mucosal thickening  within the ethmoid air cells. Mastoid air cells are clear.      Visualized orbital structures are unremarkable.      Visualized upper lungs are clear.      Mild osseous degenerative changes of the cervical spine.      Impression: Stable postoperative changes from resection of the right parotid  gland with no evidence of recurrent or residual tumor or cervical  lymphadenopathy by size criteria.      This study was interpreted at [facility].          MACRO:  None      Signed by: Calvin Serrano 6/20/2024 1:28 PM  Dictation workstation:   [id number]     === 01/23/24 ===    XR KNEE 1-2 VIEWS RIGHT    - Impression -  Nonspecific right knee joint effusion. No acute osseous abnormality.    Signed by: Luke Johns 1/23/2024 3:44 PM  Dictation workstation:   [id number]   === 07/14/22 ===    MR BRAIN W AND WO CONTRAST    -  Impression -  1. No evidence of intracranial metastatic disease.  2. Moderate paranasal sinus mucosal thickening with bilateral  maxillary sinus air-fluid levels, correlate with concern for  sinusitis.    I personally reviewed the images/study and I agree with the findings  as stated. This study was interpreted at [facility], [place], [place].          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  Bilateral knee ( R>L)  Completed ICI therapy - September 2023  Seronegative status, no s/s for PsA at this time  Prior visit, Gout was ruled out, has no real risk factors and imaging did not show chondrocalcinosis and US MaxFLEX view did not show evidence of such as well ( also he is 59 wo any other know metabolic risk factors for CPPD)  B/C negative status  Quantiferon not needed at this time    Off steroids x 1-2 weeks  On HCQ monothehrapy, will continue the same  Patient counselled on importance of continuing HCQ  Need to update Eye exam    Follow up x 4-5 months  Patient advised to reach out to us in case of a flare    Seen and discussed with Dr Jonel Babcock MD   Plan, including risks and benefits, was discussed with the patient, informed on how to reach us.     Rheumatology Attending    I interviewed and examined the patient and discussed the treatment plan. See above note for details.  I agree with the findings and plan of care summarized in the above note.       Dano Remy MD

## 2024-06-25 ENCOUNTER — OFFICE VISIT (OUTPATIENT)
Dept: HEMATOLOGY/ONCOLOGY | Facility: HOSPITAL | Age: 59
End: 2024-06-25
Payer: COMMERCIAL

## 2024-06-25 VITALS
OXYGEN SATURATION: 99 % | DIASTOLIC BLOOD PRESSURE: 70 MMHG | HEART RATE: 56 BPM | RESPIRATION RATE: 17 BRPM | TEMPERATURE: 97.2 F | WEIGHT: 164.24 LBS | BODY MASS INDEX: 24.97 KG/M2 | SYSTOLIC BLOOD PRESSURE: 110 MMHG

## 2024-06-25 DIAGNOSIS — Z92.89 HISTORY OF IMMUNOTHERAPY: ICD-10-CM

## 2024-06-25 DIAGNOSIS — C79.89 METASTATIC MELANOMA TO PAROTID GLAND (MULTI): Primary | ICD-10-CM

## 2024-06-25 DIAGNOSIS — E03.2 HYPOTHYROIDISM DUE TO MEDICATION: ICD-10-CM

## 2024-06-25 PROCEDURE — 99215 OFFICE O/P EST HI 40 MIN: CPT | Performed by: NURSE PRACTITIONER

## 2024-06-25 PROCEDURE — 1036F TOBACCO NON-USER: CPT | Performed by: NURSE PRACTITIONER

## 2024-06-25 ASSESSMENT — ENCOUNTER SYMPTOMS
MUSCULOSKELETAL NEGATIVE: 1
ENDOCRINE NEGATIVE: 1
OCCASIONAL FEELINGS OF UNSTEADINESS: 0
HEMATOLOGIC/LYMPHATIC NEGATIVE: 1
CARDIOVASCULAR NEGATIVE: 1
NEUROLOGICAL NEGATIVE: 1
PSYCHIATRIC NEGATIVE: 1
EYES NEGATIVE: 1
GASTROINTESTINAL NEGATIVE: 1
DEPRESSION: 0
LOSS OF SENSATION IN FEET: 0
RESPIRATORY NEGATIVE: 1

## 2024-06-25 ASSESSMENT — PAIN SCALES - GENERAL: PAINLEVEL: 0-NO PAIN

## 2024-06-25 NOTE — PROGRESS NOTES
".Patient ID: Errol Das is a 59 y.o. male.  Referring Physician: No referring provider defined for this encounter.  Primary Care Provider: Keith Matos MD MPH     Oncology follow up     HPI  Mr. Das is referred to our clinic by Dr. 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 He met with our team on 5/31/22.      Mr. Das met with Dr. Bhat on 4/15/22. He was referred by his PCP after the patient told them about a lump in the parotid gland region since beginning of 2022. the lump had grown significantly. He underwent FNAC on 4/28/22 and the path read  melanoma. Then he was taken to OR on 5/23/22 and underwent resection. Pathology report reads 1/16 positive nodes. The lone node has 6.5mm deposit with MELANIE. The parotid mass is necrotic and measures 2.3cm. Negative for BRAF V600.     PET scan (on 7/11/22) and MRI brain (7/14/22) did not show any activity concerning for tumors. We recommend adjuvant pembrolizumab. Patient consented on 7/19/22 and started Keytruda 400mg every 6 weeks on 7/26/22. He completed one year of therapy on 08/01/2023 and is currently on surveillance.     Treatment History:    Systemic treatment  1. Pembrolizumab 400mg every 6 weeks- s/p 9 cycles as of 08/01/2023     irAE's  1. Hypothyroidism (currently on Synthroid 112mcg PO daily).     Subjective   Please refer to \"Notes/Cancer History\" above for complete History of present illness.     Mr Errol Das     - Presents to clinic alone.   - Feels well overall.   - Met with rheumatology and put on Plaquenil for inflammatory arthritis. No joint pain at this time.   - Continues skin checks with dermatology.      Review of Systems:   Review of Systems   HENT:  Negative.     Eyes: Negative.    Respiratory: Negative.     Cardiovascular: Negative.    Gastrointestinal: Negative.    Endocrine: Negative.    Genitourinary: Negative.     Musculoskeletal: Negative.         Follows with rheumatology for " inflammatory arthritis.    Skin: Negative.    Neurological: Negative.    Hematological: Negative.    Psychiatric/Behavioral: Negative.           MEDICAL HISTORY  Melanoma, hypothyroidism     FAMILY HISTORY  No family history on file.    TOBACCO HISTORY  Tobacco Use: Low Risk  (6/24/2024)    Patient History     Smoking Tobacco Use: Never     Smokeless Tobacco Use: Never     Passive Exposure: Never       SOCIAL HISTORY  Social Connections: Not on file   , lives with his wife. Has a son and daughter. Works full time as a professor at [employer].       Outpatient Medication Profile:  Current Outpatient Medications on File Prior to Visit   Medication Sig Dispense Refill    hydroxychloroquine (Plaquenil) 200 mg tablet Take 2 tablets (400 mg) by mouth once daily. 60 tablet 5    levothyroxine (Synthroid, Levoxyl) 112 mcg tablet Take 1 tablet (112 mcg) by mouth once daily in the morning. Take before meals. 30 tablet 11    [DISCONTINUED] predniSONE (Deltasone) 5 mg tablet Take 4 tablets (20 mg) by mouth once daily for 7 days, THEN 3 tablets (15 mg) once daily for 7 days, THEN 2 tablets (10 mg) once daily for 7 days, THEN 1.5 tablets (7.5 mg) once daily. 108 tablet 0     No current facility-administered medications on file prior to visit.         Performance Status:  Asymptomatic     Vitals and Measurements:   Vitals:    06/25/24 1434   BP: 110/70   Pulse: 56   Resp: 17   Temp: 36.2 °C (97.2 °F)   TempSrc: Skin   SpO2: 99%   Weight: 74.5 kg (164 lb 3.9 oz)   PainSc: 0-No pain        Physical Exam:   Physical Exam  Constitutional:       Appearance: Normal appearance.   HENT:      Head: Normocephalic and atraumatic.      Nose: Nose normal.      Mouth/Throat:      Mouth: Mucous membranes are moist.      Pharynx: Oropharynx is clear.   Eyes:      Conjunctiva/sclera: Conjunctivae normal.   Neck:      Comments: Healed incisions without nodules   Cardiovascular:      Rate and Rhythm: Normal rate and regular rhythm.      Pulses:  Normal pulses.      Heart sounds: Normal heart sounds.   Pulmonary:      Effort: Pulmonary effort is normal.      Breath sounds: Normal breath sounds.   Abdominal:      General: Bowel sounds are normal.      Palpations: Abdomen is soft.   Musculoskeletal:         General: Normal range of motion.      Cervical back: Neck supple.   Skin:     General: Skin is warm and dry.   Neurological:      General: No focal deficit present.      Mental Status: He is alert and oriented to person, place, and time.   Psychiatric:         Mood and Affect: Mood normal.         Behavior: Behavior normal.      Lab Results:  I have reviewed these laboratory results:     Lab on 06/20/2024   Component Date Value Ref Range Status    WBC 06/20/2024 5.2  4.4 - 11.3 x10*3/uL Final    nRBC 06/20/2024 0.0  0.0 - 0.0 /100 WBCs Final    RBC 06/20/2024 4.29 (L)  4.50 - 5.90 x10*6/uL Final    Hemoglobin 06/20/2024 13.5  13.5 - 17.5 g/dL Final    Hematocrit 06/20/2024 40.2 (L)  41.0 - 52.0 % Final    MCV 06/20/2024 94  80 - 100 fL Final    MCH 06/20/2024 31.5  26.0 - 34.0 pg Final    MCHC 06/20/2024 33.6  32.0 - 36.0 g/dL Final    RDW 06/20/2024 13.8  11.5 - 14.5 % Final    Platelets 06/20/2024 195  150 - 450 x10*3/uL Final    Neutrophils % 06/20/2024 61.3  40.0 - 80.0 % Final    Immature Granulocytes %, Automated 06/20/2024 0.4  0.0 - 0.9 % Final    Lymphocytes % 06/20/2024 17.7  13.0 - 44.0 % Final    Monocytes % 06/20/2024 12.2  2.0 - 10.0 % Final    Eosinophils % 06/20/2024 7.4  0.0 - 6.0 % Final    Basophils % 06/20/2024 1.0  0.0 - 2.0 % Final    Neutrophils Absolute 06/20/2024 3.21  1.20 - 7.70 x10*3/uL Final    Immature Granulocytes Absolute, Au* 06/20/2024 0.02  0.00 - 0.70 x10*3/uL Final    Lymphocytes Absolute 06/20/2024 0.93 (L)  1.20 - 4.80 x10*3/uL Final    Monocytes Absolute 06/20/2024 0.64  0.10 - 1.00 x10*3/uL Final    Eosinophils Absolute 06/20/2024 0.39  0.00 - 0.70 x10*3/uL Final    Basophils Absolute 06/20/2024 0.05  0.00 - 0.10  x10*3/uL Final    Glucose 06/20/2024 85  74 - 99 mg/dL Final    Sodium 06/20/2024 135 (L)  136 - 145 mmol/L Final    Potassium 06/20/2024 4.3  3.5 - 5.3 mmol/L Final    Chloride 06/20/2024 98  98 - 107 mmol/L Final    Bicarbonate 06/20/2024 28  21 - 32 mmol/L Final    Anion Gap 06/20/2024 13  10 - 20 mmol/L Final    Urea Nitrogen 06/20/2024 19  6 - 23 mg/dL Final    Creatinine 06/20/2024 0.86  0.50 - 1.30 mg/dL Final    eGFR 06/20/2024 >90  >60 mL/min/1.73m*2 Final    Calcium 06/20/2024 9.3  8.6 - 10.3 mg/dL Final    Albumin 06/20/2024 4.6  3.4 - 5.0 g/dL Final    Alkaline Phosphatase 06/20/2024 55  33 - 120 U/L Final    Total Protein 06/20/2024 6.9  6.4 - 8.2 g/dL Final    AST 06/20/2024 19  9 - 39 U/L Final    Bilirubin, Total 06/20/2024 0.5  0.0 - 1.2 mg/dL Final    ALT 06/20/2024 14  10 - 52 U/L Final    LDH 06/20/2024 161  84 - 246 U/L Final    Thyroid Stimulating Hormone 06/20/2024 2.41  0.44 - 3.98 mIU/L Final         Radiology Result:  I have reviewed the latest Imaging in PACS and the findings are noted in this note. I discussed the results of the latest imaging with the patient. All previous imaging were reviewed at the time it was completed. Full records are available in the EMR for review as well.     CT SOFT TISSUE NECK W IV CONTRAST; 6/20/2024 11:43 am     IMPRESSION:  Stable postoperative changes from resection of the right parotid  gland with no evidence of recurrent or residual tumor or cervical  lymphadenopathy by size criteria.  ====================================================  CT CHEST ABDOMEN PELVIS W IV CONTRAST; 6/20/2024 11:40 am     IMPRESSION:  Melanoma restaging scan. When compared to the prior examination dated  03/15/2024, there has been no significant interval change and no  definite evidence of new malignancy. Additional stable chronic  incidental findings described above.    Pathology Results:  I have reviewed the full pathology report recorded in the EMR. The pertinent portions  "indicating diagnosis are listed here in the note. for details please refer to the full report recorded in the EMR.    Surgical Pathology [Mohan 10 2022 2:15PM] ([id number])  Specimens: RIGHT SUPERFICIAL PAROTIDECTOMY OVER LINE FACIAL SKIN. LONG STITCH  SUPERIOR   Name MATTIE MCCULLOUGH     Accession #: [id number]   Pathologist: TYSON LARKIN MD, Board Certified   Dermatopathologist   Date of Procedure: 5/23/2022   Date Received: 5/23/2022   Date Reported 6/10/2022    Submitting Physician: JORGE POWELL MD   Location: [facility] Other External #     FINAL DIAGNOSIS   A. SPECIMEN DESIGNATED \"RIGHT SUPERFICIAL PAROTIDECTOMY OVER LINE FACIAL   SKIN\":   --METASTATIC MELANOMA INVOLVING AT LEAST ONE  OF SIXTEEN INTRAPAROTID LYMPH NODES WITH EXTRACAPSULAR EXTENSION (1/16), SEE COMMENT   --SKIN WITH AN ATYPICAL JUNCTIONAL MELANOCYTIC PROLIFERATION, LOCALIZED AREA OF DERMAL MELANOSIS AND BIOPSY SITE CHANGES, SEE COMMENT     COMMENT: Sections  show actinically altered skin with increased single mild to moderately enlarged junctional melanocytes with poor circumscription, focal areas of confluence and involvement of superficial follicular epithelium, present at peripheral margins. There is a  localized collection of dermal and subcutaneous melanophages approximately 6.5 mm in depth, involving the superior margin. There is a predominantly necrotic intraparotid mass (2.3 cm) with focal collections of severely atypical melanocytes and extensive  melanosis, present at the superficial margin and extending < 1 mm from the deep and anterior margins. Melanocytes are highlighted with SOX-10 and Melan-A. Intraparotid melanocytes and isolated junctional melanocytes are PRAME positive. (Controlsstain  adequately.)     The skin findings are concerning for regressed invasive melanoma, lentigo maligna type combined with actinic melanocytosis of sun-damaged skin and reactive melanocytic hyperplasia from the trauma of the previous procedure. " The  intraparotid mass may represent multiple matted positive lymph nodes, the exact number of which cannot be determined and is therefore counted as one positive lymph node with extracapsular extension. Results of BRAF testing will be reported separately.    Electronically Signed Out By TYSON LARKIN MD, Board Certified Dermatopathologist/SWATI   By the signature on this report, the individual or group listed as making the Final Interpretation/Diagnosis certifies that they have reviewed this case. Diagnostic  interpretation performed at [facility] [address] [facility] [address]   =============================================  Addendum Diagnosis   TEST: BRAF Exon 15 Sequencing   SPECIMEN: FFPE, Mass with anterior, superficial, deep margin, Excision,   [id number]   DISEASE  DIAGNOSIS: Metastatic Melanoma   Estimated Tumor Content: 40%   COLLECTION DATE: 05/23/2022   RECEIVED DATE: 06/13/2022   REPORT DATE: 06/17/2022     RESULT: None     DISEASE RELEVANT ALTERATIONS NOT DETECTED:   Negative for BRAF V600 mutations.      Assessment and Plan:   Assessment/Plan   Mr. Errol Das is a 59 y.o. 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 Completed one year of adjuvant Pembrolizumab 08/01/2023. He is currently on surveillance.      # Melanoma  - CT 06/20/2024 showing stable changes and KAYLEN.   - RTC in 3 months on 10/01/2024.  - Labs and CT scan prior 09/26/2024.  - Continue f/u with dermatology for skin checks. Next visit with Dr. Nieto 12/09/2024.    # Arthritis  - Continue follow up with rheumatology. Next visit 11/18/2024.     # Hypothyroidism   - TSH 2.41.   - Continue levothyroxine 112mcg PO daily.      DISCLAIMER:   In preparing for this visit and writing this note, I reviewed all the previous electronic medical records (labs, imaging and medical charts) of the patient available in the physician portal.  Significant findings which helped in decision making are recorded  in this chart.     The plan was discussed with the patient. We gave him ample opportunities to ask questions. All questions were answered to his satisfaction and he verbalized understanding.      INSTRUCTIONS FOR PATIENT  Mr. Errol Das ,  It was a pleasure talking to you today.    As discussed, we will be conducting surveillance scans in 3 months on 09/26/2024. We will meet again on 10/01/2024. Please keep your appointments with dermatology and surgical oncology.     Please make sure to schedule your appointments as we discussed. Your appointments should also appear in your MyChart.   In case of an emergency please dial 911 or report to your nearest Emergency Room.  For all other questions, please do not hesitate to reach out to us at the number listed below.    Thank you for choosing [facility] at [facility].   We appreciate your visit.     MD Alisson Clifford APRN Laura Piette, RN     Sarcoma and Cutaneous Oncology team    Phone: [phone]  Fax: [phone].

## 2024-07-01 ENCOUNTER — TELEPHONE (OUTPATIENT)
Dept: ENDOCRINOLOGY | Facility: CLINIC | Age: 59
End: 2024-07-01
Payer: COMMERCIAL

## 2024-07-01 NOTE — TELEPHONE ENCOUNTER
Patient of Dr. Matos called to get refill Zolpidem Tartrate 10 MG, states that he needs it because he will be flying soon.

## 2024-08-22 ENCOUNTER — APPOINTMENT (OUTPATIENT)
Dept: PRIMARY CARE | Facility: CLINIC | Age: 59
End: 2024-08-22
Payer: COMMERCIAL

## 2024-08-22 VITALS
DIASTOLIC BLOOD PRESSURE: 66 MMHG | HEART RATE: 66 BPM | WEIGHT: 166 LBS | TEMPERATURE: 97.9 F | OXYGEN SATURATION: 96 % | HEIGHT: 68 IN | BODY MASS INDEX: 25.16 KG/M2 | SYSTOLIC BLOOD PRESSURE: 108 MMHG

## 2024-08-22 DIAGNOSIS — G47.25 CIRCADIAN RHYTHM SLEEP DISORDER, JET LAG TYPE: Primary | ICD-10-CM

## 2024-08-22 PROCEDURE — 99213 OFFICE O/P EST LOW 20 MIN: CPT | Performed by: PODIATRIST

## 2024-08-22 PROCEDURE — 1036F TOBACCO NON-USER: CPT | Performed by: PODIATRIST

## 2024-08-22 PROCEDURE — 3008F BODY MASS INDEX DOCD: CPT | Performed by: PODIATRIST

## 2024-08-22 RX ORDER — ZOLPIDEM TARTRATE 10 MG/1
10 TABLET ORAL NIGHTLY PRN
Qty: 10 TABLET | Refills: 0 | Status: SHIPPED | OUTPATIENT
Start: 2024-08-22 | End: 2024-10-21

## 2024-08-22 ASSESSMENT — ENCOUNTER SYMPTOMS
NUMBNESS: 0
WHEEZING: 0
ABDOMINAL DISTENTION: 0
FEVER: 0
SORE THROAT: 0
DYSURIA: 0
RHINORRHEA: 0
BACK PAIN: 0
VOICE CHANGE: 0
PSYCHIATRIC NEGATIVE: 1
JOINT SWELLING: 0
CHEST TIGHTNESS: 0
ACTIVITY CHANGE: 0
SINUS PRESSURE: 0
DIFFICULTY URINATING: 0
HEADACHES: 0
CONSTIPATION: 0
CHILLS: 0
ENDOCRINE NEGATIVE: 1
FATIGUE: 0
DIZZINESS: 0
ABDOMINAL PAIN: 0
EYE PAIN: 0
DIAPHORESIS: 0
HEMATOLOGIC/LYMPHATIC NEGATIVE: 1
EYE REDNESS: 0
ARTHRALGIAS: 0
AGITATION: 0
EYE ITCHING: 0
FLANK PAIN: 0
COUGH: 0
SHORTNESS OF BREATH: 0
HEMATURIA: 0
ADENOPATHY: 0

## 2024-08-22 ASSESSMENT — PAIN SCALES - GENERAL: PAINLEVEL: 0-NO PAIN

## 2024-08-22 NOTE — PROGRESS NOTES
Subjective   Patient ID: Errol Das is a 59 y.o. male who presents for Establish Care (Pt also needs something to sleep while flying. Ambien).    HPI   . Errol Das is a 59 year old male with a history of parotid gland melanoma s/p R parotidectomy 5/2022, seronegative arthritis, and hypothyroidism secondary to his cancer therapy presenting for health maintenance visit and for ambien prescription for sleep on international flights.    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 He has been prescribed Ambien in the past to help him fall asleep on flights and adjust to the time change and has been successful. He has tried melatonin and benadryl in the past and these have no worked for him. He does not have insomnia or any sleep changes on a regular basis and only needs the ambien for travel purposes.     Mr. Das was diagnosed with parotid gland melanoma in 4/2022 with subsequent parotidectomy. All follow up scans have come back clear and he follows with oncology and dermatology regularly. He developed hypothyroidism secondary to his immunotherapy and is on levothyroxine.       Review of Systems   Constitutional:  Negative for activity change, chills, diaphoresis, fatigue and fever.   HENT:  Negative for congestion, rhinorrhea, sinus pressure, sore throat and voice change.    Eyes:  Negative for pain, redness and itching.   Respiratory:  Negative for cough, chest tightness, shortness of breath and wheezing.    Cardiovascular:  Negative for chest pain and leg swelling.   Gastrointestinal:  Negative for abdominal distention, abdominal pain and constipation.   Endocrine: Negative.  Negative for cold intolerance and heat intolerance.   Genitourinary: Negative.  Negative for difficulty urinating, dysuria, flank pain and hematuria.   Musculoskeletal:  Negative for arthralgias, back pain, gait problem and joint swelling.  "  Skin: Negative.    Neurological:  Negative for dizziness, syncope, numbness and headaches.   Hematological: Negative.  Negative for adenopathy.   Psychiatric/Behavioral: Negative.  Negative for agitation and behavioral problems.        Objective   /66 (BP Location: Right arm, Patient Position: Sitting)   Pulse 66   Temp 36.6 °C (97.9 °F) (Temporal)   Ht 1.727 m (5' 8\")   Wt 75.3 kg (166 lb)   SpO2 96%   BMI 25.24 kg/m²     Physical Exam  Vitals and nursing note reviewed.   Constitutional:       General: He is not in acute distress.     Appearance: Normal appearance. He is normal weight.   HENT:      Head: Normocephalic and atraumatic.      Right Ear: Tympanic membrane normal.      Left Ear: Tympanic membrane normal.      Nose: Nose normal.      Mouth/Throat:      Mouth: Mucous membranes are moist.   Eyes:      Extraocular Movements: Extraocular movements intact.      Conjunctiva/sclera: Conjunctivae normal.   Cardiovascular:      Rate and Rhythm: Normal rate and regular rhythm.      Pulses: Normal pulses.      Heart sounds: Normal heart sounds. No murmur heard.     No friction rub. No gallop.   Pulmonary:      Effort: Pulmonary effort is normal. No respiratory distress.      Breath sounds: Normal breath sounds. No wheezing, rhonchi or rales.   Abdominal:      General: Bowel sounds are normal.      Palpations: Abdomen is soft.   Musculoskeletal:         General: No swelling. Normal range of motion.      Cervical back: Normal range of motion and neck supple.      Right lower leg: No edema.      Left lower leg: No edema.   Skin:     General: Skin is warm and dry.   Neurological:      General: No focal deficit present.      Mental Status: He is alert and oriented to person, place, and time. Mental status is at baseline.   Psychiatric:         Mood and Affect: Mood normal.         Behavior: Behavior normal.         Thought Content: Thought content normal.         Judgment: Judgment normal. "         Assessment/Plan     Mr. Errol Das is a 59 year old male with a history of parotid gland melanoma s/p R parotidectomy 5/2022, seronegative arthritis, and hypothyroidism secondary to his cancer therapy presenting for health maintenance visit and for ambien prescription for sleep on international flights.    Diagnoses and all orders for this visit:  Circadian rhythm sleep disorder, jet lag type  -     zolpidem (Ambien) 10 mg tablet; Take 1 tablet (10 mg) by mouth as needed at bedtime for sleep.   -     Gave hand out on jet lag and adjusting to new time zone. Discussed not to take EtOH at same time of Ambien. Patient in agreement. All questions answered. Offered patient melatonin and he declined.   Return to clinic  - follow up in 3-5 months for annual exam     Dodie Patel MS3  [facility]    I saw and evaluated the patient with the medical student. I personally obtained the key and critical portions of the history and physical exam. I reviewed and modified the student's documentation and discussed patient with the medical student. I agree with the above documentation and medical decision making.     Patient discussed with attending physician, Dr. Gonzalez.     Aneta Genao MD   PGY2, Family Medicine

## 2024-08-22 NOTE — PROGRESS NOTES
I reviewed the resident/fellow's documentation and discussed the patient with the resident/fellow. I agree with the resident/fellow's medical decision making as documented in the note.    Amy Gonzalez MD

## 2024-08-22 NOTE — PATIENT INSTRUCTIONS
Thank you for coming in to see us today, Mr. Errol Das! It was a pleasure managing your health together.    Today in clinic, we discussed follow up.    If we ordered bloodwork today, you can get this done at ANY  location and the results will come to me directly. The [facility] here at [facility] are walk-in (no appointment needed); [facility] lab's hours are M-F 6:30a-6p and Sat 8a-12p.    Please call  [phone] to make your appointment.     If you have any questions/concerns, you can always use [employer] to message me directly. Or if you prefer, you can call the office at [phone]; if you leave a message, the office staff should translate this to a message in my inbox.    I'm looking forward to seeing you back in clinic in 4-5 months to discuss yearly physical .    Best,  Dr. Genao

## 2024-09-25 DIAGNOSIS — C79.89 SECONDARY MALIGNANT NEOPLASM OF OTHER SPECIFIED SITES: Primary | ICD-10-CM

## 2024-09-26 ENCOUNTER — HOSPITAL ENCOUNTER (OUTPATIENT)
Dept: RADIOLOGY | Facility: HOSPITAL | Age: 59
Discharge: HOME | End: 2024-09-26
Payer: COMMERCIAL

## 2024-09-26 ENCOUNTER — LAB (OUTPATIENT)
Dept: LAB | Facility: HOSPITAL | Age: 59
End: 2024-09-26
Payer: COMMERCIAL

## 2024-09-26 DIAGNOSIS — C79.89 METASTATIC MELANOMA TO PAROTID GLAND (MULTI): ICD-10-CM

## 2024-09-26 DIAGNOSIS — Z92.89 HISTORY OF IMMUNOTHERAPY: ICD-10-CM

## 2024-09-26 DIAGNOSIS — E03.2 HYPOTHYROIDISM DUE TO MEDICATION: ICD-10-CM

## 2024-09-26 DIAGNOSIS — C79.89 SECONDARY MALIGNANT NEOPLASM OF OTHER SPECIFIED SITES: ICD-10-CM

## 2024-09-26 LAB
ALBUMIN SERPL BCP-MCNC: 4.5 G/DL (ref 3.4–5)
ALP SERPL-CCNC: 46 U/L (ref 33–120)
ALT SERPL W P-5'-P-CCNC: 11 U/L (ref 10–52)
ANION GAP SERPL CALC-SCNC: 13 MMOL/L (ref 10–20)
AST SERPL W P-5'-P-CCNC: 20 U/L (ref 9–39)
BASOPHILS # BLD AUTO: 0.07 X10*3/UL (ref 0–0.1)
BASOPHILS NFR BLD AUTO: 1.3 %
BILIRUB SERPL-MCNC: 0.5 MG/DL (ref 0–1.2)
BUN SERPL-MCNC: 10 MG/DL (ref 6–23)
CALCIUM SERPL-MCNC: 9.4 MG/DL (ref 8.6–10.3)
CHLORIDE SERPL-SCNC: 103 MMOL/L (ref 98–107)
CO2 SERPL-SCNC: 28 MMOL/L (ref 21–32)
CREAT SERPL-MCNC: 0.83 MG/DL (ref 0.5–1.3)
EGFRCR SERPLBLD CKD-EPI 2021: >90 ML/MIN/1.73M*2
EOSINOPHIL # BLD AUTO: 0.69 X10*3/UL (ref 0–0.7)
EOSINOPHIL NFR BLD AUTO: 12.7 %
ERYTHROCYTE [DISTWIDTH] IN BLOOD BY AUTOMATED COUNT: 13.4 % (ref 11.5–14.5)
GLUCOSE SERPL-MCNC: 93 MG/DL (ref 74–99)
HCT VFR BLD AUTO: 38.9 % (ref 41–52)
HGB BLD-MCNC: 13.1 G/DL (ref 13.5–17.5)
IMM GRANULOCYTES # BLD AUTO: 0.01 X10*3/UL (ref 0–0.7)
IMM GRANULOCYTES NFR BLD AUTO: 0.2 % (ref 0–0.9)
LDH SERPL L TO P-CCNC: 131 U/L (ref 84–246)
LYMPHOCYTES # BLD AUTO: 1.34 X10*3/UL (ref 1.2–4.8)
LYMPHOCYTES NFR BLD AUTO: 24.7 %
MCH RBC QN AUTO: 32.2 PG (ref 26–34)
MCHC RBC AUTO-ENTMCNC: 33.7 G/DL (ref 32–36)
MCV RBC AUTO: 96 FL (ref 80–100)
MONOCYTES # BLD AUTO: 0.62 X10*3/UL (ref 0.1–1)
MONOCYTES NFR BLD AUTO: 11.4 %
NEUTROPHILS # BLD AUTO: 2.7 X10*3/UL (ref 1.2–7.7)
NEUTROPHILS NFR BLD AUTO: 49.7 %
NRBC BLD-RTO: 0 /100 WBCS (ref 0–0)
PLATELET # BLD AUTO: 208 X10*3/UL (ref 150–450)
POTASSIUM SERPL-SCNC: 3.6 MMOL/L (ref 3.5–5.3)
PROT SERPL-MCNC: 6.9 G/DL (ref 6.4–8.2)
RBC # BLD AUTO: 4.07 X10*6/UL (ref 4.5–5.9)
SODIUM SERPL-SCNC: 140 MMOL/L (ref 136–145)
TSH SERPL-ACNC: 1.44 MIU/L (ref 0.44–3.98)
WBC # BLD AUTO: 5.4 X10*3/UL (ref 4.4–11.3)

## 2024-09-26 PROCEDURE — 2550000001 HC RX 255 CONTRASTS: Performed by: NURSE PRACTITIONER

## 2024-09-26 PROCEDURE — 85025 COMPLETE CBC W/AUTO DIFF WBC: CPT

## 2024-09-26 PROCEDURE — 83615 LACTATE (LD) (LDH) ENZYME: CPT

## 2024-09-26 PROCEDURE — 74177 CT ABD & PELVIS W/CONTRAST: CPT

## 2024-09-26 PROCEDURE — 80053 COMPREHEN METABOLIC PANEL: CPT

## 2024-09-26 PROCEDURE — 70491 CT SOFT TISSUE NECK W/DYE: CPT

## 2024-09-26 PROCEDURE — 84443 ASSAY THYROID STIM HORMONE: CPT

## 2024-09-26 PROCEDURE — 36415 COLL VENOUS BLD VENIPUNCTURE: CPT

## 2024-09-30 ENCOUNTER — APPOINTMENT (OUTPATIENT)
Dept: PRIMARY CARE | Facility: CLINIC | Age: 59
End: 2024-09-30
Payer: COMMERCIAL

## 2024-10-01 ENCOUNTER — OFFICE VISIT (OUTPATIENT)
Dept: HEMATOLOGY/ONCOLOGY | Facility: HOSPITAL | Age: 59
End: 2024-10-01
Payer: COMMERCIAL

## 2024-10-01 VITALS
OXYGEN SATURATION: 99 % | SYSTOLIC BLOOD PRESSURE: 113 MMHG | DIASTOLIC BLOOD PRESSURE: 66 MMHG | WEIGHT: 164.24 LBS | TEMPERATURE: 97.7 F | RESPIRATION RATE: 20 BRPM | BODY MASS INDEX: 24.97 KG/M2 | HEART RATE: 57 BPM

## 2024-10-01 DIAGNOSIS — E03.2 HYPOTHYROIDISM DUE TO MEDICATION: ICD-10-CM

## 2024-10-01 DIAGNOSIS — Z92.89 HISTORY OF IMMUNOTHERAPY: ICD-10-CM

## 2024-10-01 DIAGNOSIS — C79.89 METASTATIC MELANOMA TO PAROTID GLAND (MULTI): Primary | ICD-10-CM

## 2024-10-01 PROCEDURE — 99215 OFFICE O/P EST HI 40 MIN: CPT | Performed by: NURSE PRACTITIONER

## 2024-10-01 ASSESSMENT — ENCOUNTER SYMPTOMS
GASTROINTESTINAL NEGATIVE: 1
HEMATOLOGIC/LYMPHATIC NEGATIVE: 1
EYES NEGATIVE: 1
RESPIRATORY NEGATIVE: 1
PSYCHIATRIC NEGATIVE: 1
MUSCULOSKELETAL NEGATIVE: 1
NEUROLOGICAL NEGATIVE: 1
ENDOCRINE NEGATIVE: 1
CARDIOVASCULAR NEGATIVE: 1

## 2024-10-01 ASSESSMENT — PAIN SCALES - GENERAL: PAINLEVEL: 0-NO PAIN

## 2024-10-01 NOTE — PROGRESS NOTES
".Patient ID: Errol Das is a 59 y.o. male.  Referring Physician: Alisson Fishman, APRN-CNP  [address] [place] [address]  Primary Care Provider: No Assigned PCP Generic Provider, MD     Oncology follow up     HPI  Mr. Das is referred to our clinic by Dr. 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 He met with our team on 5/31/22.      Mr. Das met with Dr. Bhat on 4/15/22. He was referred by his PCP after the patient told them about a lump in the parotid gland region since beginning of 2022. the lump had grown significantly. He underwent FNAC on 4/28/22 and the path read  melanoma. Then he was taken to OR on 5/23/22 and underwent resection. Pathology report reads 1/16 positive nodes. The lone node has 6.5mm deposit with MELANIE. The parotid mass is necrotic and measures 2.3cm. Negative for BRAF V600.     PET scan (on 7/11/22) and MRI brain (7/14/22) did not show any activity concerning for tumors. We recommend adjuvant pembrolizumab. Patient consented on 7/19/22 and started Keytruda 400mg every 6 weeks on 7/26/22. He completed one year of therapy on 08/01/2023 and is currently on surveillance.     Treatment History:    Systemic treatment  1. Pembrolizumab 400mg every 6 weeks- s/p 9 cycles as of 08/01/2023     irAE's  1. Hypothyroidism (currently on Synthroid 112mcg PO daily).     Subjective   Please refer to \"Notes/Cancer History\" above for complete History of present illness.     Mr Errol Das     - Presents to clinic alone.   - Feels well overall.   - No new symptoms or concerns.   - Continues on Plaquenil for inflammatory arthritis. No joint pain at this time. Follows with rheumatology.   - Continues skin checks with dermatology.   - Planning a trip to [place] with his family in December-January 3 for the holidays.      Review of Systems:   Review of Systems   HENT:  Negative.     Eyes: Negative.    Respiratory: Negative.     Cardiovascular: " Negative.    Gastrointestinal: Negative.    Endocrine: Negative.    Genitourinary: Negative.     Musculoskeletal: Negative.         Follows with rheumatology for inflammatory arthritis.    Skin: Negative.    Neurological: Negative.    Hematological: Negative.    Psychiatric/Behavioral: Negative.           MEDICAL HISTORY  Melanoma, hypothyroidism     FAMILY HISTORY  No family history on file.    TOBACCO HISTORY  Tobacco Use: Low Risk  (8/22/2024)    Patient History     Smoking Tobacco Use: Never     Smokeless Tobacco Use: Never     Passive Exposure: Never       SOCIAL HISTORY  Social Connections: Not on file   , lives with his wife. Has a son and daughter. Works full time as a professor at [employer].       Outpatient Medication Profile:  Current Outpatient Medications on File Prior to Visit   Medication Sig Dispense Refill    hydroxychloroquine (Plaquenil) 200 mg tablet Take 2 tablets (400 mg) by mouth once daily. 60 tablet 5    levothyroxine (Synthroid, Levoxyl) 112 mcg tablet Take 1 tablet (112 mcg) by mouth once daily in the morning. Take before meals. 30 tablet 11    zolpidem (Ambien) 10 mg tablet Take 1 tablet (10 mg) by mouth as needed at bedtime for sleep. 10 tablet 0    [DISCONTINUED] pembrolizumab (Keytruda) 25 mg/mL chemo injection Infuse into a venous catheter.       No current facility-administered medications on file prior to visit.         Performance Status:  Asymptomatic     Vitals and Measurements:   Vitals:    10/01/24 1320   BP: 113/66   Pulse: 57   Resp: 20   Temp: 36.5 °C (97.7 °F)   TempSrc: Core   SpO2: 99%   Weight: 74.5 kg (164 lb 3.9 oz)   PainSc: 0-No pain        Physical Exam:   Physical Exam  Constitutional:       Appearance: Normal appearance.   HENT:      Head: Normocephalic and atraumatic.      Nose: Nose normal.      Mouth/Throat:      Mouth: Mucous membranes are moist.      Pharynx: Oropharynx is clear.   Eyes:      Conjunctiva/sclera: Conjunctivae normal.   Neck:      Comments:  Healed incisions without nodules   Cardiovascular:      Rate and Rhythm: Normal rate and regular rhythm.      Pulses: Normal pulses.      Heart sounds: Normal heart sounds.   Pulmonary:      Effort: Pulmonary effort is normal.      Breath sounds: Normal breath sounds.   Abdominal:      General: Bowel sounds are normal.      Palpations: Abdomen is soft.   Musculoskeletal:         General: Normal range of motion.      Cervical back: Neck supple.   Skin:     General: Skin is warm and dry.   Neurological:      General: No focal deficit present.      Mental Status: He is alert and oriented to person, place, and time.   Psychiatric:         Mood and Affect: Mood normal.         Behavior: Behavior normal.      Lab Results:  I have reviewed these laboratory results:     Lab on 09/26/2024   Component Date Value Ref Range Status    WBC 09/26/2024 5.4  4.4 - 11.3 x10*3/uL Final    nRBC 09/26/2024 0.0  0.0 - 0.0 /100 WBCs Final    RBC 09/26/2024 4.07 (L)  4.50 - 5.90 x10*6/uL Final    Hemoglobin 09/26/2024 13.1 (L)  13.5 - 17.5 g/dL Final    Hematocrit 09/26/2024 38.9 (L)  41.0 - 52.0 % Final    MCV 09/26/2024 96  80 - 100 fL Final    MCH 09/26/2024 32.2  26.0 - 34.0 pg Final    MCHC 09/26/2024 33.7  32.0 - 36.0 g/dL Final    RDW 09/26/2024 13.4  11.5 - 14.5 % Final    Platelets 09/26/2024 208  150 - 450 x10*3/uL Final    Neutrophils % 09/26/2024 49.7  40.0 - 80.0 % Final    Immature Granulocytes %, Automated 09/26/2024 0.2  0.0 - 0.9 % Final    Lymphocytes % 09/26/2024 24.7  13.0 - 44.0 % Final    Monocytes % 09/26/2024 11.4  2.0 - 10.0 % Final    Eosinophils % 09/26/2024 12.7  0.0 - 6.0 % Final    Basophils % 09/26/2024 1.3  0.0 - 2.0 % Final    Neutrophils Absolute 09/26/2024 2.70  1.20 - 7.70 x10*3/uL Final    Immature Granulocytes Absolute, Au* 09/26/2024 0.01  0.00 - 0.70 x10*3/uL Final    Lymphocytes Absolute 09/26/2024 1.34  1.20 - 4.80 x10*3/uL Final    Monocytes Absolute 09/26/2024 0.62  0.10 - 1.00 x10*3/uL Final     Eosinophils Absolute 09/26/2024 0.69  0.00 - 0.70 x10*3/uL Final    Basophils Absolute 09/26/2024 0.07  0.00 - 0.10 x10*3/uL Final    Glucose 09/26/2024 93  74 - 99 mg/dL Final    Sodium 09/26/2024 140  136 - 145 mmol/L Final    Potassium 09/26/2024 3.6  3.5 - 5.3 mmol/L Final    Chloride 09/26/2024 103  98 - 107 mmol/L Final    Bicarbonate 09/26/2024 28  21 - 32 mmol/L Final    Anion Gap 09/26/2024 13  10 - 20 mmol/L Final    Urea Nitrogen 09/26/2024 10  6 - 23 mg/dL Final    Creatinine 09/26/2024 0.83  0.50 - 1.30 mg/dL Final    eGFR 09/26/2024 >90  >60 mL/min/1.73m*2 Final    Calcium 09/26/2024 9.4  8.6 - 10.3 mg/dL Final    Albumin 09/26/2024 4.5  3.4 - 5.0 g/dL Final    Alkaline Phosphatase 09/26/2024 46  33 - 120 U/L Final    Total Protein 09/26/2024 6.9  6.4 - 8.2 g/dL Final    AST 09/26/2024 20  9 - 39 U/L Final    Bilirubin, Total 09/26/2024 0.5  0.0 - 1.2 mg/dL Final    ALT 09/26/2024 11  10 - 52 U/L Final    LDH 09/26/2024 131  84 - 246 U/L Final    Thyroid Stimulating Hormone 09/26/2024 1.44  0.44 - 3.98 mIU/L Final         Radiology Result:  I have reviewed the latest Imaging in PACS and the findings are noted in this note. I discussed the results of the latest imaging with the patient. All previous imaging were reviewed at the time it was completed. Full records are available in the EMR for review as well.     CT SOFT TISSUE NECK W IV CONTRAST;  9/26/2024 1:44 pm      IMPRESSION:  Stable postoperative/posttreatment change following right  parotidectomy. No abnormal masslike enhancement or cervical  lymphadenopathy.  ====================================================  CT CHEST ABDOMEN PELVIS W IV CONTRAST; 9/26/2024 1:42 pm     IMPRESSION:  Melanoma surveillance scan:  1. When compared to the prior examination dated 06/20/2024, there has  been no significant interval change and no definite evidence of new  malignancy.  2. Left proximal urothelial prominence/thickening. Likely represents  nonspecific  "infection/inflammation. Recommend correlation with  urinalysis and continued attention on follow-up exam.  3. Additional stable chronic incidental findings described above.    Pathology Results:  I have reviewed the full pathology report recorded in the EMR. The pertinent portions indicating diagnosis are listed here in the note. for details please refer to the full report recorded in the EMR.    Surgical Pathology [Mohan 10 2022 2:15PM] ([id number])  Specimens: RIGHT SUPERFICIAL PAROTIDECTOMY OVER LINE FACIAL SKIN. LONG STITCH  SUPERIOR   Name MATTIE MCCULLOUGH     Accession #: [id number]   Pathologist: TYSON LARKIN MD, Board Certified   Dermatopathologist   Date of Procedure: 5/23/2022   Date Received: 5/23/2022   Date Reported 6/10/2022    Submitting Physician: JORGE POWELL MD   Location: [facility] Other External #     FINAL DIAGNOSIS   A. SPECIMEN DESIGNATED \"RIGHT SUPERFICIAL PAROTIDECTOMY OVER LINE FACIAL   SKIN\":   --METASTATIC MELANOMA INVOLVING AT LEAST ONE  OF SIXTEEN INTRAPAROTID LYMPH NODES WITH EXTRACAPSULAR EXTENSION (1/16), SEE COMMENT   --SKIN WITH AN ATYPICAL JUNCTIONAL MELANOCYTIC PROLIFERATION, LOCALIZED AREA OF DERMAL MELANOSIS AND BIOPSY SITE CHANGES, SEE COMMENT     COMMENT: Sections  show actinically altered skin with increased single mild to moderately enlarged junctional melanocytes with poor circumscription, focal areas of confluence and involvement of superficial follicular epithelium, present at peripheral margins. There is a  localized collection of dermal and subcutaneous melanophages approximately 6.5 mm in depth, involving the superior margin. There is a predominantly necrotic intraparotid mass (2.3 cm) with focal collections of severely atypical melanocytes and extensive  melanosis, present at the superficial margin and extending < 1 mm from the deep and anterior margins. Melanocytes are highlighted with SOX-10 and Melan-A. Intraparotid melanocytes and isolated junctional " melanocytes are PRAME positive. (Controlsstain  adequately.)     The skin findings are concerning for regressed invasive melanoma, lentigo maligna type combined with actinic melanocytosis of sun-damaged skin and reactive melanocytic hyperplasia from the trauma of the previous procedure. The  intraparotid mass may represent multiple matted positive lymph nodes, the exact number of which cannot be determined and is therefore counted as one positive lymph node with extracapsular extension. Results of BRAF testing will be reported separately.    Electronically Signed Out By TYSON LARKIN MD, Board Certified Dermatopathologist/SWATI   By the signature on this report, the individual or group listed as making the Final Interpretation/Diagnosis certifies that they have reviewed this case. Diagnostic  interpretation performed at [facility] [address] [facility] [address]   =============================================  Addendum Diagnosis   TEST: BRAF Exon 15 Sequencing   SPECIMEN: FFPE, Mass with anterior, superficial, deep margin, Excision,   [id number]   DISEASE  DIAGNOSIS: Metastatic Melanoma   Estimated Tumor Content: 40%   COLLECTION DATE: 05/23/2022   RECEIVED DATE: 06/13/2022   REPORT DATE: 06/17/2022     RESULT: None     DISEASE RELEVANT ALTERATIONS NOT DETECTED:   Negative for BRAF V600 mutations.      Assessment and Plan:   Assessment/Plan   Mr. Errol Das is a 59 y.o. 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 Completed one year of adjuvant Pembrolizumab 08/01/2023. He is currently on surveillance.      # Melanoma  - CT 09/25/2024 showing overall stable changes and KAYLEN. Left urothelial thickening noted. He declined UA at this time as he is asymptomatic. I will follow up on the next CT scan in 3 months.   - RTC in 3 months on 01/14/2025.  - Labs and CT scan prior 01/09/2025.  - Continue f/u with  dermatology for skin checks. Next visit with Dr. Nieto 12/09/2024.    # Arthritis  - Continue follow up with rheumatology. Next visit 11/18/2024.     # Hypothyroidism   - TSH 1.44  - Continue levothyroxine 112mcg PO daily.      DISCLAIMER:   In preparing for this visit and writing this note, I reviewed all the previous electronic medical records (labs, imaging and medical charts) of the patient available in the physician portal. Significant findings which helped in decision making are recorded  in this chart.     The plan was discussed with the patient. We gave him ample opportunities to ask questions. All questions were answered to his satisfaction and he verbalized understanding.      INSTRUCTIONS FOR PATIENT  Mr. Errol Das ,  It was a pleasure talking to you today.    As discussed, we will be conducting surveillance scans in 3 months on 01/09/2025. We will meet again on 01/14/2025. Please keep your appointments with dermatology and surgical oncology.     Please make sure to schedule your appointments as we discussed. Your appointments should also appear in your MyChart.   In case of an emergency please dial 911 or report to your nearest Emergency Room.  For all other questions, please do not hesitate to reach out to us at the number listed below.    Thank you for choosing [facility] at [facility].   We appreciate your visit.     MD Alisson Clifford APRN Laura Piette, RN     Sarcoma and Cutaneous Oncology team    Phone: [phone]  Fax: [phone].

## 2024-10-16 DIAGNOSIS — M19.90 INFLAMMATORY ARTHRITIS: ICD-10-CM

## 2024-10-16 RX ORDER — HYDROXYCHLOROQUINE SULFATE 200 MG/1
TABLET, FILM COATED ORAL DAILY
Qty: 60 TABLET | Refills: 5 | Status: SHIPPED | OUTPATIENT
Start: 2024-10-16

## 2024-11-17 NOTE — PROGRESS NOTES
Recall  Mr. 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 He was referred by his PCP after the patient told them about a lump in the parotid gland region since beginning of 2022. the lump had grown significantly. He underwent FNAC on 4/28/22 and the path read  melanoma. Then he was taken to OR on 5/23/22 and underwent resection. Pathology report reads 1/16 positive nodes. The lone node has 6.5mm deposit with MELANIE. The parotid mass is necrotic and measures 2.3cm. Negative for BRAF V600.     PET scan (on 7/11/22) and MRI brain (7/14/22) did not show any activity concerning for tumors. We recommend adjuvant pembrolizumab. Patient consented on 7/19/22 and started Keytruda 400mg every 6 weeks on 7/26/22. He completed one year of therapy on 08/01/2023 and is currently on surveillance.      Treatment History:    Systemic treatment  1. Pembrolizumab 400mg every 6 weeks- s/p 9 cycles as of 08/01/2023  Prior  irAE's  1. Hypothyroidism (currently on Synthroid 112mcg PO daily).      PhD cancer genetics researcher by profession  Patient is an avid runner, runs ultra marathons  Has noticed bilateral knee pain from January onwards, mainly in the right knee  Was not related to activity, would feel pain at rest, along with stiffness and walking around would help  Stiffness generally less than 30 mins; he gradually noticed the swelling in the knee along with pain that was getting worse  Visited  in March, had right knee aspiration performed  That was consitent with inflammatory arthritis with negative cultures and negative crystal exam  Had knee xray performed that showed moderate effusion otherwise preserved Joint space  Labs otherwise showed normal UA levels, negative RF and ACPA.  Referred to rheumatology for further diagnostic work up and management    Mentions he had moderate  improvement after drainage, but symptoms have persisted and now gradually getting worse  He is not taking any OTC analgesia  Has recently started running again and gradually increasing his average.    Other joints are fine,   No swelling of the knuckles, difficulty making a fist  No rash, sicca symptoms, photosensitivity, dyspnea , abdominal pains  No RP , oral or genital ulcers    On levothyroxine for ICI induced hypothyroidism, otherwise not on any other long term meds  Completed ICI August/september 2023    Subjective   Patient ID: [id number]   Errol Das is a 59 y.o. male who presents for follow-up of inflammatory arthritis.    Chief Complaint: inflammatory arthritis    Current treatment:   mg every day (4/2024- )    Previous treatment:  Prednisone     Interval History   Last seen 6/2024 by Dr. Babcock  Has not used prednisone since prior to last visit  Has not had eye exam yet  Right knee is still swollen, gotten a little worse. Has a little bit of pain  Gets worse with running, but wants to continue to be active    Still having some stiffness in knees, hips in AM, but reports this predates inflammatory arthritis symptoms  Other joints are fine  No rash  No s/e from HCQ    Per onc note 10/2024, 9/2024 surveillance CT stable and no evidence of melanoma  Last derm 6/2024 no evidence of malignancy, RTC 6 mo (12/2024)    9/2024 labs  CMP stable  CBC stable     ROS  As per HPI    FHx: No family history of autoimmune diseases       Patient Active Problem List   Diagnosis    Melanoma of skin (Multi)    Metastatic melanoma to parotid gland (Multi)    Hypothyroid    Chronic pain of right knee    History of immunotherapy    Seronegative arthritis        No past medical history on file.     Past Surgical History:   Procedure Laterality Date    OTHER SURGICAL HISTORY  10/28/2013    Corneal LASIK Bilateral    OTHER SURGICAL HISTORY  10/28/2013    Finger Tendon Lengthening    TONSILLECTOMY  10/28/2013     Tonsillectomy        Social History     Socioeconomic History    Marital status:      Spouse name: Not on file    Number of children: Not on file    Years of education: Not on file    Highest education level: Not on file   Occupational History    Not on file   Tobacco Use    Smoking status: Never     Passive exposure: Never    Smokeless tobacco: Never   Substance and Sexual Activity    Alcohol use: Yes     Alcohol/week: 7.0 standard drinks of alcohol     Types: 7 Cans of beer per week     Comment: in a week    Drug use: Never    Sexual activity: Not on file   Other Topics Concern    Not on file   Social History Narrative    Not on file     Social Drivers of Health     Financial Resource Strain: Not on file   Food Insecurity: Not on file   Transportation Needs: Not on file   Physical Activity: Not on file   Stress: Not on file   Social Connections: Not on file   Intimate Partner Violence: Not on file   Housing Stability: Not on file        No Known Allergies       Current Outpatient Medications:     levothyroxine (Synthroid, Levoxyl) 112 mcg tablet, Take 1 tablet (112 mcg) by mouth once daily in the morning. Take before meals., Disp: 30 tablet, Rfl: 11    hydroxychloroquine (Plaquenil) 200 mg tablet, Take 2 tablets (400 mg) by mouth once daily., Disp: 90 tablet, Rfl: 1  No current facility-administered medications for this visit.       Objective     Visit Vitals  /69 (BP Location: Right arm, Patient Position: Sitting, BP Cuff Size: Adult)   Pulse 57         Physical Exam  General: AAOx3, Cooperative  Head: normocephalic, atraumatic  Eyes: EOMI, conjunctiva clear, sclera white, anicteric  Ears: no redness, swelling, tenderness  Nose: no deformity, no crusting   Throat/Mouth: No oral deformities, no cheek swelling, mucosa appear moist, no oral ulcers noted or loss of dentition   Neck/Lymph: FROM, trachea midline  Lungs: chest expansion symmetric. No respiratory distress.   Heart: RRR  Neuro: CN II-XII  grossly intact, no focal deficit  Skin: No rashes, ulcers or photosensitive areas  MSK: Upper Extremities:  Hand/Fingers: No erythema, swelling, tenderness or warmth at DIP, PIP, or MCP joints, FROM grossly. Good hand . No nodules. No deformities   Wrists: No erythema, swelling, warmth or tenderness at wrist, FROM grossly  Elbows: No tenderness, swelling, erythema or warmth at elbows, FROM grossly. No nodules   Shoulders: No swelling, erythema, tenderness or warmth at shoulders. FROM  Lower Extremities:   Hips: No obvious deformities. No joint tenderness, normal ROM grossly. Log roll test negative bilaterally. Selena test is negative bilaterally. No trochanteric bursae TTP  Knees: previously 4/2024  bilateral warm knees with moderate effusion with synovial hypertrophy in the right knee  With minimal tenderness over the medial suprapatellar aspect  -Bedside ultrasound shows moderate effusion , no evidence of enthesitis at the quadriceps and patellar tendon insertion sites  -Lateral and medial meniscus wo any defects on ultrasound    6/24   Normal knee exam    11/2024  Right knee with large suprapatellar effusion, left knee mild-moderate effusion. No erythema, warmth, or tenderness of either knee.     Lab Results   Component Value Date    WBC 5.4 09/26/2024    HGB 13.1 (L) 09/26/2024    HCT 38.9 (L) 09/26/2024    MCV 96 09/26/2024     09/26/2024        Chemistry    Lab Results   Component Value Date/Time     09/26/2024 1246    K 3.6 09/26/2024 1246     09/26/2024 1246    CO2 28 09/26/2024 1246    BUN 10 09/26/2024 1246    CREATININE 0.83 09/26/2024 1246    Lab Results   Component Value Date/Time    CALCIUM 9.4 09/26/2024 1246    ALKPHOS 46 09/26/2024 1246    AST 20 09/26/2024 1246    ALT 11 09/26/2024 1246    BILITOT 0.5 09/26/2024 1246           Lab Results   Component Value Date    CRP 0.25 04/29/2024      Lab Results   Component Value Date    RF <10 03/14/2024    SEDRATE 27 (H) 03/14/2024     "  No results found for: \"CKTOTAL\"  Lab Results   Component Value Date    NEUTROABS 2.70 09/26/2024      No results found for: \"FERRITIN\"   Lab Results   Component Value Date    HEPBCIGM NONREACTIVE 07/19/2022    HEPCAB NONREACTIVE 07/19/2022      Lab Results   Component Value Date    ALT 11 09/26/2024    AST 20 09/26/2024    ALKPHOS 46 09/26/2024    BILITOT 0.5 09/26/2024      No results found for: \"PPD\"   Lab Results   Component Value Date    URICACID 4.2 03/14/2024      Lab Results   Component Value Date    CALCIUM 9.4 09/26/2024      No results found for: \"SPEP\", \"UPEP\"   No results found for: \"ALBUR\", \"XFR87YDY\"   .last          CT soft tissue neck w IV contrast  Narrative: Interpreted By:  Gil Catalan,   STUDY:  CT SOFT TISSUE NECK W IV CONTRAST;  9/26/2024 1:44 pm      INDICATION:  Signs/Symptoms:Melanoma metastatic to the parotid gland s/p resection  and immunotherapy, currently on surveillance.      ,C79.89 Secondary malignant neoplasm of other specified sites,Z92.89  Personal history of other medical treatment      COMPARISON:  CT neck dated 06/20/2024. concurrent CT chest, abdomen, and pelvis.      ACCESSION NUMBER(S):  [id number]      ORDERING CLINICIAN:  TYSON GUADALUPE      TECHNIQUE:  Axial CT images of the neck were obtained. The images were  reformatted in angled axial, coronal and sagittal planes. A total of  120 mL Omnipaque 350 intravenous contrast was administered internal.      FINDINGS:  Oral Cavity, Pharynx and Larynx:  Unchanged appearance of the  nasopharynx with asymmetric dilatation of the left Eustachian tube  and protrusion along the leftward aspect of the nasopharynx. The oral  cavity, oropharynx, hypopharynx, and larynx are otherwise normal in  CT appearance. No pharyngeal mass or fluid collection. Airway is  widely patent.      Retropharyngeal and Prevertebral Soft Tissues: Unremarkable.      Lymph nodes: There are few non specific bilateral neck nodes,  probably reactive in " etiology.      Neck vessels: Bilateral neck vessels are normal in course and caliber  and appear patent.      Thyroid gland: Mildly atrophic, otherwise unremarkable.      Parotid and submandibular glands: Right parotid gland appears  surgically absent. Bilateral submandibular and left parotid glands  are unremarkable.      Paranasal Sinuses and Mastoids: Moderate diffuse mucosal thickening  of the maxillary paranasal sinuses bilaterally. Scattered mucosal  thickening of the bilateral ethmoids. Mild mucosal thickening of the  sphenoid sinuses. The mastoids are well aerated bilaterally with  trace dependent fluid on the right.      Osseous Structures: No suspicious osseous lesions.      Visualized orbital structures are unremarkable.      Visualized upper lungs are clear.      Impression: Stable postoperative/posttreatment change following right  parotidectomy. No abnormal masslike enhancement or cervical  lymphadenopathy.          MACRO:  None      Signed by: Gil Catalan 9/27/2024 12:21 PM  Dictation workstation:   [id number]     === 01/23/24 ===    XR KNEE 1-2 VIEWS RIGHT    - Impression -  Nonspecific right knee joint effusion. No acute osseous abnormality.    Signed by: Luke Johns 1/23/2024 3:44 PM  Dictation workstation:   [id number]   === 07/14/22 ===    MR BRAIN W AND WO CONTRAST    - Impression -  1. No evidence of intracranial metastatic disease.  2. Moderate paranasal sinus mucosal thickening with bilateral  maxillary sinus air-fluid levels, correlate with concern for  sinusitis.    I personally reviewed the images/study and I agree with the findings  as stated. This study was interpreted at [place], [place].      Patient ID: Errol Das is a 59 y.o. male.    Large Joint Injection/Arthrocentesis: R supra patellar bursa on 11/18/2024 11:00 AM  Indications: pain, joint swelling and diagnostic evaluation  Details: 20 G needle, medial  approach  Medications: 2.5 mg triamcinolone acetonide 40 mg/mL; 0.5 mL lidocaine 10 mg/mL (1 %)  Aspirate: yellow and serous; sent for lab analysis  Outcome: tolerated well, no immediate complications  Consent was given by the patient. Immediately prior to procedure a time out was called to verify the correct patient, procedure, equipment, support staff and site/side marked as required. Patient was prepped and draped in the usual sterile fashion.           Assessment/Plan   58 y/o gentleman with PMH of metastatic parotid melanoma s/p pembrolizumab, hypothyroidism, who presnets for follow-up of ICI-induced inflammatory arthritis.    ICI induced inflammatory arthritis - Impression is Grade 2 irAE  History of immunotherapy ( Pembrolizumab PDL1i)  Bilateral knees ( R>L)  Completed ICI therapy - September 2023  Seronegative status, no s/s of PsA at this time  At 4/2024 visit, Gout was ruled out, has no real risk factors and imaging did not show chondrocalcinosis and US MaxFLEX view did not show evidence of such as well ( also he is 59 wo any other know metabolic risk factors for CPPD)  Hep B/C negative status  Quantiferon not needed at this time    R suprapatellar joint aspiration performed today, with approximately 5 ml of serous yellow fluid obtained. Fluid sent for analysis  40 mg of Kenalog also injected, patient tolerated procedure well  On HCQ monotherapy, will adjust dose to 400 mg Monday-Friday and 200 mg Sat/Sun (for average daily dose 342.8 mg, max daily dose is 367.5 mg based on 5 mg/kg dosing)  Patient counselled on importance of continuing HCQ monotherapy   Optho referral placed for HCQ eye exam    RTC 6 months and sooner PRN    Patient seen and discussed with Dr. Feliz.    Christiana Cobb MD  Rheumatology Fellow, PGY-4    Orders Placed This Encounter   Procedures    Large Joint Injection/Arthrocentesis: R supra patellar bursa    Sterile Fluid Culture/Smear    Crystal Identification and Pathologist Review  Synovial Fluid    Body Fluid Cell Count With Differential    Body Fluid Cell Count    Crystal Identification, Synovial Fluid    Referral to Ophthalmology

## 2024-11-18 ENCOUNTER — APPOINTMENT (OUTPATIENT)
Dept: RHEUMATOLOGY | Facility: CLINIC | Age: 59
End: 2024-11-18
Payer: COMMERCIAL

## 2024-11-18 VITALS
HEART RATE: 57 BPM | SYSTOLIC BLOOD PRESSURE: 113 MMHG | BODY MASS INDEX: 24.55 KG/M2 | HEIGHT: 68 IN | DIASTOLIC BLOOD PRESSURE: 69 MMHG | WEIGHT: 162 LBS

## 2024-11-18 DIAGNOSIS — M25.561 CHRONIC PAIN OF RIGHT KNEE: ICD-10-CM

## 2024-11-18 DIAGNOSIS — M19.90 INFLAMMATORY ARTHRITIS: Primary | ICD-10-CM

## 2024-11-18 DIAGNOSIS — Z92.89 HISTORY OF IMMUNOTHERAPY: ICD-10-CM

## 2024-11-18 DIAGNOSIS — Z79.899 LONG-TERM USE OF HYDROXYCHLOROQUINE: ICD-10-CM

## 2024-11-18 DIAGNOSIS — M13.80 SERONEGATIVE ARTHRITIS: ICD-10-CM

## 2024-11-18 DIAGNOSIS — G89.29 CHRONIC PAIN OF RIGHT KNEE: ICD-10-CM

## 2024-11-18 LAB
BASOPHILS NFR FLD MANUAL: 0 %
BLASTS NFR FLD MANUAL: 0 %
CLARITY FLD: ABNORMAL
COLOR FLD: YELLOW
CRYSTALS FLD MICRO: ABNORMAL
EOSINOPHIL NFR FLD MANUAL: 0 %
IMMATURE GRANULOCYTES IN FLUID: 0 %
LYMPHOCYTES NFR FLD MANUAL: 5 %
MONOS+MACROS NFR FLD MANUAL: 17 %
NEUTROPHILS NFR FLD MANUAL: 78 %
OTHER CELLS NFR FLD MANUAL: 0 %
PLASMA CELLS NFR FLD MANUAL: 0 %
RBC # FLD AUTO: 1000 /UL
TOTAL CELLS COUNTED FLD: 100
WBC # FLD AUTO: ABNORMAL /UL

## 2024-11-18 PROCEDURE — 20610 DRAIN/INJ JOINT/BURSA W/O US: CPT | Performed by: STUDENT IN AN ORGANIZED HEALTH CARE EDUCATION/TRAINING PROGRAM

## 2024-11-18 PROCEDURE — 99214 OFFICE O/P EST MOD 30 MIN: CPT | Performed by: STUDENT IN AN ORGANIZED HEALTH CARE EDUCATION/TRAINING PROGRAM

## 2024-11-18 PROCEDURE — 3008F BODY MASS INDEX DOCD: CPT | Performed by: STUDENT IN AN ORGANIZED HEALTH CARE EDUCATION/TRAINING PROGRAM

## 2024-11-18 PROCEDURE — 89060 EXAM SYNOVIAL FLUID CRYSTALS: CPT

## 2024-11-18 PROCEDURE — 89051 BODY FLUID CELL COUNT: CPT

## 2024-11-18 RX ORDER — TRIAMCINOLONE ACETONIDE 40 MG/ML
2.5 INJECTION, SUSPENSION INTRA-ARTICULAR; INTRAMUSCULAR
Status: COMPLETED | OUTPATIENT
Start: 2024-11-18 | End: 2024-11-18

## 2024-11-18 RX ORDER — LIDOCAINE HYDROCHLORIDE 10 MG/ML
0.5 INJECTION, SOLUTION INFILTRATION; PERINEURAL
Status: COMPLETED | OUTPATIENT
Start: 2024-11-18 | End: 2024-11-18

## 2024-11-18 RX ORDER — HYDROXYCHLOROQUINE SULFATE 200 MG/1
400 TABLET, FILM COATED ORAL DAILY
Qty: 90 TABLET | Refills: 1 | Status: SHIPPED | OUTPATIENT
Start: 2024-11-18

## 2024-11-19 DIAGNOSIS — M11.20 PSEUDOGOUT: Primary | ICD-10-CM

## 2024-11-19 LAB — PATH REVIEW-CRYSTALS: NORMAL

## 2024-11-20 ENCOUNTER — LAB (OUTPATIENT)
Dept: LAB | Facility: LAB | Age: 59
End: 2024-11-20
Payer: COMMERCIAL

## 2024-11-20 DIAGNOSIS — M11.20 PSEUDOGOUT: ICD-10-CM

## 2024-11-20 LAB
FERRITIN SERPL-MCNC: 22 NG/ML (ref 20–300)
IRON SATN MFR SERPL: 13 % (ref 25–45)
IRON SERPL-MCNC: 48 UG/DL (ref 35–150)
MAGNESIUM SERPL-MCNC: 2.07 MG/DL (ref 1.6–2.4)
PTH-INTACT SERPL-MCNC: 33 PG/ML (ref 18.5–88)
TIBC SERPL-MCNC: 360 UG/DL (ref 240–445)
TRANSFERRIN SERPL-MCNC: 270 MG/DL (ref 200–360)
UIBC SERPL-MCNC: 312 UG/DL (ref 110–370)

## 2024-11-20 PROCEDURE — 36415 COLL VENOUS BLD VENIPUNCTURE: CPT

## 2024-11-20 PROCEDURE — 83540 ASSAY OF IRON: CPT

## 2024-11-20 PROCEDURE — 82728 ASSAY OF FERRITIN: CPT

## 2024-11-20 PROCEDURE — 84466 ASSAY OF TRANSFERRIN: CPT

## 2024-11-20 PROCEDURE — 83970 ASSAY OF PARATHORMONE: CPT

## 2024-11-20 PROCEDURE — 83735 ASSAY OF MAGNESIUM: CPT

## 2024-11-20 PROCEDURE — 83550 IRON BINDING TEST: CPT

## 2024-12-09 ENCOUNTER — APPOINTMENT (OUTPATIENT)
Dept: DERMATOLOGY | Facility: CLINIC | Age: 59
End: 2024-12-09
Payer: COMMERCIAL

## 2024-12-09 DIAGNOSIS — Z85.828 HISTORY OF NONMELANOMA SKIN CANCER: ICD-10-CM

## 2024-12-09 DIAGNOSIS — L57.8 SUN-DAMAGED SKIN: ICD-10-CM

## 2024-12-09 DIAGNOSIS — D22.9 MULTIPLE BENIGN MELANOCYTIC NEVI: ICD-10-CM

## 2024-12-09 DIAGNOSIS — Z12.83 SKIN EXAM, SCREENING FOR CANCER: Primary | ICD-10-CM

## 2024-12-09 DIAGNOSIS — L57.0 ACTINIC KERATOSIS: ICD-10-CM

## 2024-12-09 DIAGNOSIS — Z85.820 PERSONAL HISTORY OF MALIGNANT MELANOMA: ICD-10-CM

## 2024-12-09 DIAGNOSIS — L90.5 SCAR CONDITIONS AND FIBROSIS OF SKIN: ICD-10-CM

## 2024-12-09 DIAGNOSIS — L82.1 SEBORRHEIC KERATOSES: ICD-10-CM

## 2024-12-09 DIAGNOSIS — D18.01 CHERRY ANGIOMA: ICD-10-CM

## 2024-12-09 PROCEDURE — 99213 OFFICE O/P EST LOW 20 MIN: CPT | Performed by: DERMATOLOGY

## 2024-12-09 PROCEDURE — 17000 DESTRUCT PREMALG LESION: CPT | Performed by: STUDENT IN AN ORGANIZED HEALTH CARE EDUCATION/TRAINING PROGRAM

## 2024-12-09 PROCEDURE — 17003 DESTRUCT PREMALG LES 2-14: CPT | Performed by: STUDENT IN AN ORGANIZED HEALTH CARE EDUCATION/TRAINING PROGRAM

## 2024-12-09 ASSESSMENT — DERMATOLOGY QUALITY OF LIFE (QOL) ASSESSMENT
RATE HOW EMOTIONALLY BOTHERED YOU ARE BY YOUR SKIN PROBLEM (FOR EXAMPLE, WORRY, EMBARRASSMENT, FRUSTRATION): 0 - NEVER BOTHERED
RATE HOW BOTHERED YOU ARE BY EFFECTS OF YOUR SKIN PROBLEMS ON YOUR ACTIVITIES (EG, GOING OUT, ACCOMPLISHING WHAT YOU WANT, WORK ACTIVITIES OR YOUR RELATIONSHIPS WITH OTHERS): 0 - NEVER BOTHERED
ARE THERE EXCLUSIONS OR EXCEPTIONS FOR THE QUALITY OF LIFE ASSESSMENT: NO
WHAT SINGLE SKIN CONDITION LISTED BELOW IS THE PATIENT ANSWERING THE QUALITY-OF-LIFE ASSESSMENT QUESTIONS ABOUT: NONE OF THE ABOVE
RATE HOW EMOTIONALLY BOTHERED YOU ARE BY YOUR SKIN PROBLEM (FOR EXAMPLE, WORRY, EMBARRASSMENT, FRUSTRATION): 0 - NEVER BOTHERED
RATE HOW BOTHERED YOU ARE BY SYMPTOMS OF YOUR SKIN PROBLEM (EG, ITCHING, STINGING BURNING, HURTING OR SKIN IRRITATION): 0 - NEVER BOTHERED
WHAT SINGLE SKIN CONDITION LISTED BELOW IS THE PATIENT ANSWERING THE QUALITY-OF-LIFE ASSESSMENT QUESTIONS ABOUT: NONE OF THE ABOVE
RATE HOW EMOTIONALLY BOTHERED YOU ARE BY YOUR SKIN PROBLEM (FOR EXAMPLE, WORRY, EMBARRASSMENT, FRUSTRATION): 0 - NEVER BOTHERED
DATE THE QUALITY-OF-LIFE ASSESSMENT WAS COMPLETED: 67183
RATE HOW BOTHERED YOU ARE BY SYMPTOMS OF YOUR SKIN PROBLEM (EG, ITCHING, STINGING BURNING, HURTING OR SKIN IRRITATION): 0 - NEVER BOTHERED
RATE HOW BOTHERED YOU ARE BY SYMPTOMS OF YOUR SKIN PROBLEM (EG, ITCHING, STINGING BURNING, HURTING OR SKIN IRRITATION): 0 - NEVER BOTHERED
RATE HOW BOTHERED YOU ARE BY EFFECTS OF YOUR SKIN PROBLEMS ON YOUR ACTIVITIES (EG, GOING OUT, ACCOMPLISHING WHAT YOU WANT, WORK ACTIVITIES OR YOUR RELATIONSHIPS WITH OTHERS): 0 - NEVER BOTHERED
RATE HOW BOTHERED YOU ARE BY EFFECTS OF YOUR SKIN PROBLEMS ON YOUR ACTIVITIES (EG, GOING OUT, ACCOMPLISHING WHAT YOU WANT, WORK ACTIVITIES OR YOUR RELATIONSHIPS WITH OTHERS): 0 - NEVER BOTHERED
DATE THE QUALITY-OF-LIFE ASSESSMENT WAS COMPLETED: 67183

## 2024-12-09 ASSESSMENT — PATIENT GLOBAL ASSESSMENT (PGA)
PATIENT GLOBAL ASSESSMENT: PATIENT GLOBAL ASSESSMENT:  1 - CLEAR
WHAT IS THE PGA: PATIENT GLOBAL ASSESSMENT:  1 - CLEAR

## 2024-12-09 ASSESSMENT — DERMATOLOGY PATIENT ASSESSMENT
DO YOU HAVE ANY NEW OR CHANGING LESIONS: NO
HAVE YOU HAD OR DO YOU HAVE VASCULAR DISEASE: NO
ARE YOU AN ORGAN TRANSPLANT RECIPIENT: NO
HAVE YOU HAD OR DO YOU HAVE A STAPH INFECTION: NO
DO YOU USE SUNSCREEN: OCCASIONALLY
DO YOU USE A TANNING BED: NO

## 2024-12-09 ASSESSMENT — ITCH NUMERIC RATING SCALE: HOW SEVERE IS YOUR ITCHING?: 0

## 2024-12-09 NOTE — PROGRESS NOTES
Subjective     Errol Das is a 59 y.o. male who presents for the following: Skin Check (FBSE, no spots of concern).     Review of Systems:  No other skin or systemic complaints other than what is documented elsewhere in the note.    The following portions of the chart were reviewed this encounter and updated as appropriate:         Skin Cancer History  No skin cancer on file.      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 Poe 06/2023             Objective   Well appearing patient in no apparent distress; mood and affect are within normal limits.    A full examination was performed including scalp, head, eyes, ears, nose, lips, neck, chest, axillae, abdomen, back, buttocks, bilateral upper extremities, bilateral lower extremities, hands, feet, fingers, toes, fingernails, and toenails. All findings within normal limits unless otherwise noted below.    Assessment/Plan   1. Skin exam, screening for cancer  No other lesions concerning for skin cancer    Total Body Skin Exam  - No other lesions concerning for malignancy on the remainder the skin exam today  - The ugly duckling sign was discussed. Recommended to monitor for any skin lesions that are different in color, shape, or size than others on body  - Sun protection was discussed. Recommend SPF 30+, hats with brims, sun protective clothing, and avoiding sun exposure between 10 AM and 2 PM whenever possible  - Follow-up visit in clinic in 6 months      Related Procedures  Follow Up In Dermatology - Established Patient    2. Personal history of malignant melanoma  No cervical, axillary, or inguinal lymphadenopathy bilaterally.    Related Procedures  Follow Up In  Dermatology - Established Patient  Follow Up In Dermatology - Established Patient    3. Sun-damaged skin  - scattered tan macules, telangiectasias, and general photo-damage    Actinically damaged skin-  - Sun protective behavior reviewed and encouraged including the use of over-the-counter sunscreen with SPF30+ daily (reapply every 1.5 hours when outdoors), UPF clothing, broad rimmed hats, sunglasses, and avoidance of midday sun. Home skin monitoring encouraged and how to monitor for skin cancer (changing or new moles, new rapidly growing or non-healing lesions) reviewed. Patient encouraged to call with interval concerns or changes.      4. Scar conditions and fibrosis of skin  - Well healed scar at prior treatment sites without visual or palpable evidence of recurrence.     - Well healed scar(s) at sites of prior skin cancer.  - Sun protective behavior reviewed and encouraged including the use of over-the-counter sunscreen with SPF30+ daily (reapply every 1.5 hours when outdoors), UPF clothing, broad rimmed hats, sunglasses, and avoidance of midday sun. Home skin monitoring encouraged and how to monitor for skin cancer (changing or new moles, new rapidly growing or non-healing lesions) reviewed. Patient encouraged to call with interval concerns or changes.       5. Cherry angioma  - scattered small bright red papules and macules    Cherry angioma(s)  - Discussed benign nature and that no treatment is necessary unless it becomes painful or increases in size. Patient opts for clinical monitoring at this time.      6. Seborrheic keratoses  - Scattered waxy tan/grey/brown papules with horn cysts    Seborrheic keratosis (-es)  - Discussed benign nature and that no treatment is necessary unless it becomes painful or increases in size. Patient opts for clinical monitoring at this time.      7. Multiple benign melanocytic nevi  - scattered regular brown macules and papules    Benign melanocytic nevi  - Discussed benign  nature and that no treatment is necessary unless it becomes painful or increases in size. Patient opts for clinical monitoring at this time.    - Sun protective behavior reviewed and encouraged including the use of over-the-counter sunscreen with SPF30+ daily (reapply every 1.5 hours when outdoors), UPF clothing, broad rimmed hats, sunglasses, and avoidance of midday sun. Home skin monitoring encouraged and how to monitor for skin cancer (changing or new moles, new rapidly growing or non-healing lesions) reviewed. Patient encouraged to call with interval concerns or changes.      8. History of nonmelanoma skin cancer    9. Actinic keratosis (14)  Head - Anterior (Face) (14)  Erythematous macules with gritty scale.    Destr of lesion - Head - Anterior (Face) (14)  Complexity: simple    Destruction method: cryotherapy    Informed consent: discussed and consent obtained    Lesion destroyed using liquid nitrogen: Yes    Region frozen until ice ball extended beyond lesion: Yes    Cryotherapy cycles:  1  Outcome: patient tolerated procedure well with no complications    Post-procedure details: wound care instructions given      Follow up in 6 months for a full body skin exam    Kendrick Hill MD  PGY-2, Dermatology    I saw and evaluated the patient, participating in the key elements of the service.  I discussed the findings, assessment and plan with the resident and agree with resident’s findings and plan as documented in the resident's note.  I was immediately available for the entirety of the procedure(s) and present for the key and critical portions.     Justino Nieto MD PhD

## 2025-01-08 DIAGNOSIS — C79.89 SECONDARY MALIGNANT NEOPLASM OF OTHER SPECIFIED SITES: Primary | ICD-10-CM

## 2025-01-09 ENCOUNTER — LAB (OUTPATIENT)
Dept: LAB | Facility: HOSPITAL | Age: 60
End: 2025-01-09
Payer: COMMERCIAL

## 2025-01-09 ENCOUNTER — HOSPITAL ENCOUNTER (OUTPATIENT)
Dept: RADIOLOGY | Facility: HOSPITAL | Age: 60
Discharge: HOME | End: 2025-01-09
Payer: COMMERCIAL

## 2025-01-09 DIAGNOSIS — C79.89 METASTATIC MELANOMA TO PAROTID GLAND (MULTI): ICD-10-CM

## 2025-01-09 DIAGNOSIS — Z92.89 HISTORY OF IMMUNOTHERAPY: ICD-10-CM

## 2025-01-09 DIAGNOSIS — E03.2 HYPOTHYROIDISM DUE TO MEDICATION: ICD-10-CM

## 2025-01-09 DIAGNOSIS — C79.89 SECONDARY MALIGNANT NEOPLASM OF OTHER SPECIFIED SITES: ICD-10-CM

## 2025-01-09 LAB
ALBUMIN SERPL BCP-MCNC: 4.8 G/DL (ref 3.4–5)
ALP SERPL-CCNC: 72 U/L (ref 33–120)
ALT SERPL W P-5'-P-CCNC: 13 U/L (ref 10–52)
ANION GAP SERPL CALC-SCNC: 12 MMOL/L (ref 10–20)
AST SERPL W P-5'-P-CCNC: 19 U/L (ref 9–39)
BASOPHILS # BLD AUTO: 0.09 X10*3/UL (ref 0–0.1)
BASOPHILS NFR BLD AUTO: 1.4 %
BILIRUB SERPL-MCNC: 0.3 MG/DL (ref 0–1.2)
BUN SERPL-MCNC: 10 MG/DL (ref 6–23)
CALCIUM SERPL-MCNC: 9.4 MG/DL (ref 8.6–10.3)
CHLORIDE SERPL-SCNC: 105 MMOL/L (ref 98–107)
CO2 SERPL-SCNC: 30 MMOL/L (ref 21–32)
CREAT SERPL-MCNC: 0.72 MG/DL (ref 0.5–1.3)
EGFRCR SERPLBLD CKD-EPI 2021: >90 ML/MIN/1.73M*2
EOSINOPHIL # BLD AUTO: 0.81 X10*3/UL (ref 0–0.7)
EOSINOPHIL NFR BLD AUTO: 13 %
ERYTHROCYTE [DISTWIDTH] IN BLOOD BY AUTOMATED COUNT: 13.1 % (ref 11.5–14.5)
GLUCOSE SERPL-MCNC: 89 MG/DL (ref 74–99)
HCT VFR BLD AUTO: 41.6 % (ref 41–52)
HGB BLD-MCNC: 13.8 G/DL (ref 13.5–17.5)
IMM GRANULOCYTES # BLD AUTO: 0.01 X10*3/UL (ref 0–0.7)
IMM GRANULOCYTES NFR BLD AUTO: 0.2 % (ref 0–0.9)
LDH SERPL L TO P-CCNC: 144 U/L (ref 84–246)
LYMPHOCYTES # BLD AUTO: 1.46 X10*3/UL (ref 1.2–4.8)
LYMPHOCYTES NFR BLD AUTO: 23.5 %
MCH RBC QN AUTO: 31.7 PG (ref 26–34)
MCHC RBC AUTO-ENTMCNC: 33.2 G/DL (ref 32–36)
MCV RBC AUTO: 95 FL (ref 80–100)
MONOCYTES # BLD AUTO: 0.37 X10*3/UL (ref 0.1–1)
MONOCYTES NFR BLD AUTO: 6 %
NEUTROPHILS # BLD AUTO: 3.47 X10*3/UL (ref 1.2–7.7)
NEUTROPHILS NFR BLD AUTO: 55.9 %
NRBC BLD-RTO: 0 /100 WBCS (ref 0–0)
PLATELET # BLD AUTO: 248 X10*3/UL (ref 150–450)
POTASSIUM SERPL-SCNC: 4.5 MMOL/L (ref 3.5–5.3)
PROT SERPL-MCNC: 7.1 G/DL (ref 6.4–8.2)
RBC # BLD AUTO: 4.36 X10*6/UL (ref 4.5–5.9)
SODIUM SERPL-SCNC: 142 MMOL/L (ref 136–145)
TSH SERPL-ACNC: 1.17 MIU/L (ref 0.44–3.98)
WBC # BLD AUTO: 6.2 X10*3/UL (ref 4.4–11.3)

## 2025-01-09 PROCEDURE — 71260 CT THORAX DX C+: CPT | Performed by: RADIOLOGY

## 2025-01-09 PROCEDURE — 74177 CT ABD & PELVIS W/CONTRAST: CPT

## 2025-01-09 PROCEDURE — 36415 COLL VENOUS BLD VENIPUNCTURE: CPT

## 2025-01-09 PROCEDURE — 85025 COMPLETE CBC W/AUTO DIFF WBC: CPT

## 2025-01-09 PROCEDURE — 74177 CT ABD & PELVIS W/CONTRAST: CPT | Performed by: RADIOLOGY

## 2025-01-09 PROCEDURE — 70491 CT SOFT TISSUE NECK W/DYE: CPT

## 2025-01-09 PROCEDURE — 83615 LACTATE (LD) (LDH) ENZYME: CPT

## 2025-01-09 PROCEDURE — 70491 CT SOFT TISSUE NECK W/DYE: CPT | Performed by: RADIOLOGY

## 2025-01-09 PROCEDURE — 84443 ASSAY THYROID STIM HORMONE: CPT

## 2025-01-09 PROCEDURE — 2550000001 HC RX 255 CONTRASTS: Performed by: NURSE PRACTITIONER

## 2025-01-09 PROCEDURE — 84075 ASSAY ALKALINE PHOSPHATASE: CPT

## 2025-01-09 RX ADMIN — IOHEXOL 120 ML: 350 INJECTION, SOLUTION INTRAVENOUS at 15:29

## 2025-01-14 ENCOUNTER — OFFICE VISIT (OUTPATIENT)
Dept: HEMATOLOGY/ONCOLOGY | Facility: HOSPITAL | Age: 60
End: 2025-01-14
Payer: COMMERCIAL

## 2025-01-14 VITALS
WEIGHT: 156.97 LBS | RESPIRATION RATE: 22 BRPM | DIASTOLIC BLOOD PRESSURE: 73 MMHG | TEMPERATURE: 98.4 F | HEART RATE: 65 BPM | OXYGEN SATURATION: 100 % | BODY MASS INDEX: 23.87 KG/M2 | SYSTOLIC BLOOD PRESSURE: 118 MMHG

## 2025-01-14 DIAGNOSIS — C77.9 MELANOMA METASTATIC TO LYMPH NODE (MULTI): ICD-10-CM

## 2025-01-14 DIAGNOSIS — C79.89 METASTATIC MELANOMA TO PAROTID GLAND (MULTI): Primary | ICD-10-CM

## 2025-01-14 DIAGNOSIS — E03.9 HYPOTHYROIDISM, UNSPECIFIED TYPE: ICD-10-CM

## 2025-01-14 DIAGNOSIS — Z92.89 HISTORY OF IMMUNOTHERAPY: ICD-10-CM

## 2025-01-14 PROCEDURE — 99215 OFFICE O/P EST HI 40 MIN: CPT | Performed by: NURSE PRACTITIONER

## 2025-01-14 PROCEDURE — 1036F TOBACCO NON-USER: CPT | Performed by: NURSE PRACTITIONER

## 2025-01-14 RX ORDER — LEVOTHYROXINE SODIUM 112 UG/1
112 TABLET ORAL
Qty: 30 TABLET | Refills: 11 | Status: SHIPPED | OUTPATIENT
Start: 2025-01-14 | End: 2026-01-14

## 2025-01-14 ASSESSMENT — ENCOUNTER SYMPTOMS
ENDOCRINE NEGATIVE: 1
GASTROINTESTINAL NEGATIVE: 1
PSYCHIATRIC NEGATIVE: 1
HEMATOLOGIC/LYMPHATIC NEGATIVE: 1
CARDIOVASCULAR NEGATIVE: 1
NEUROLOGICAL NEGATIVE: 1
EYES NEGATIVE: 1
RESPIRATORY NEGATIVE: 1
MUSCULOSKELETAL NEGATIVE: 1

## 2025-01-14 ASSESSMENT — PAIN SCALES - GENERAL: PAINLEVEL_OUTOF10: 0-NO PAIN

## 2025-01-14 NOTE — PROGRESS NOTES
".Patient ID: Errol Das is a 59 y.o. male.  Referring Physician: Alisson Fishman, APRN-CNP  [address] [place] [address]  Primary Care Provider: No Assigned PCP Generic Provider, MD     Oncology follow up     HPI  Mr. Das is referred to our clinic by Dr. 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 He met with our team on 5/31/22.      Mr. Das met with Dr. Bhat on 4/15/22. He was referred by his PCP after the patient told them about a lump in the parotid gland region since beginning of 2022. the lump had grown significantly. He underwent FNAC on 4/28/22 and the path read  melanoma. Then he was taken to OR on 5/23/22 and underwent resection. Pathology report reads 1/16 positive nodes. The lone node has 6.5mm deposit with MELANIE. The parotid mass is necrotic and measures 2.3cm. Negative for BRAF V600.     PET scan (on 7/11/22) and MRI brain (7/14/22) did not show any activity concerning for tumors. We recommend adjuvant pembrolizumab. Patient consented on 7/19/22 and started Keytruda 400mg every 6 weeks on 7/26/22. He completed one year of therapy on 08/01/2023 and is currently on surveillance.     Treatment History:    Systemic treatment  1. Pembrolizumab 400mg every 6 weeks- s/p 9 cycles as of 08/01/2023     irAE's  1. Hypothyroidism (currently on Synthroid 112mcg PO daily).     Subjective   Please refer to \"Notes/Cancer History\" above for complete History of present illness.     Mr Errol Dsa     - Presents to clinic alone.   - Feels well overall.   - No new symptoms or concerns.   - Continues to work full time and is training for another marathon.   - Continues on Plaquenil for inflammatory arthritis. No joint pain at this time. Follows with rheumatology.   - Continues skin checks with dermatology.      Review of Systems:   Review of Systems   HENT:  Negative.     Eyes: Negative.    Respiratory: Negative.     Cardiovascular: Negative.  "   Gastrointestinal: Negative.    Endocrine: Negative.    Genitourinary: Negative.     Musculoskeletal: Negative.         Follows with rheumatology for inflammatory arthritis.    Skin: Negative.    Neurological: Negative.    Hematological: Negative.    Psychiatric/Behavioral: Negative.           MEDICAL HISTORY  Melanoma, hypothyroidism     FAMILY HISTORY  No family history on file.    TOBACCO HISTORY  Tobacco Use: Low Risk  (8/22/2024)    Patient History     Smoking Tobacco Use: Never     Smokeless Tobacco Use: Never     Passive Exposure: Never       SOCIAL HISTORY  Social Connections: Not on file   , lives with his wife. Has a son and daughter. Works full time as a professor at [employer].       Outpatient Medication Profile:  Current Outpatient Medications on File Prior to Visit   Medication Sig Dispense Refill    hydroxychloroquine (Plaquenil) 200 mg tablet Take 2 tablets (400 mg) by mouth once daily. 90 tablet 1    levothyroxine (Synthroid, Levoxyl) 112 mcg tablet Take 1 tablet (112 mcg) by mouth once daily in the morning. Take before meals. 30 tablet 11     No current facility-administered medications on file prior to visit.         Performance Status:  Asymptomatic     Vitals and Measurements:   Vitals:    01/14/25 1443   BP: 118/73   Pulse: 65   Resp: 22   Temp: 36.9 °C (98.4 °F)   TempSrc: Temporal   SpO2: 100%   Weight: 71.2 kg (156 lb 15.5 oz)   PainSc: 0-No pain        Physical Exam:   Physical Exam  Constitutional:       Appearance: Normal appearance.   HENT:      Head: Normocephalic and atraumatic.      Nose: Nose normal.      Mouth/Throat:      Mouth: Mucous membranes are moist.      Pharynx: Oropharynx is clear.   Eyes:      Conjunctiva/sclera: Conjunctivae normal.   Neck:      Comments: Healed incisions without nodules   Cardiovascular:      Rate and Rhythm: Normal rate and regular rhythm.      Pulses: Normal pulses.      Heart sounds: Normal heart sounds.   Pulmonary:      Effort: Pulmonary effort  is normal.      Breath sounds: Normal breath sounds.   Abdominal:      General: Bowel sounds are normal.      Palpations: Abdomen is soft.   Musculoskeletal:         General: Normal range of motion.      Cervical back: Neck supple.   Skin:     General: Skin is warm and dry.   Neurological:      General: No focal deficit present.      Mental Status: He is alert and oriented to person, place, and time.   Psychiatric:         Mood and Affect: Mood normal.         Behavior: Behavior normal.      Lab Results:  I have reviewed these laboratory results:     Lab on 01/09/2025   Component Date Value Ref Range Status    Glucose 01/09/2025 89  74 - 99 mg/dL Final    Sodium 01/09/2025 142  136 - 145 mmol/L Final    Potassium 01/09/2025 4.5  3.5 - 5.3 mmol/L Final    Chloride 01/09/2025 105  98 - 107 mmol/L Final    Bicarbonate 01/09/2025 30  21 - 32 mmol/L Final    Anion Gap 01/09/2025 12  10 - 20 mmol/L Final    Urea Nitrogen 01/09/2025 10  6 - 23 mg/dL Final    Creatinine 01/09/2025 0.72  0.50 - 1.30 mg/dL Final    eGFR 01/09/2025 >90  >60 mL/min/1.73m*2 Final    Calcium 01/09/2025 9.4  8.6 - 10.3 mg/dL Final    Albumin 01/09/2025 4.8  3.4 - 5.0 g/dL Final    Alkaline Phosphatase 01/09/2025 72  33 - 120 U/L Final    Total Protein 01/09/2025 7.1  6.4 - 8.2 g/dL Final    AST 01/09/2025 19  9 - 39 U/L Final    Bilirubin, Total 01/09/2025 0.3  0.0 - 1.2 mg/dL Final    ALT 01/09/2025 13  10 - 52 U/L Final    WBC 01/09/2025 6.2  4.4 - 11.3 x10*3/uL Final    nRBC 01/09/2025 0.0  0.0 - 0.0 /100 WBCs Final    RBC 01/09/2025 4.36 (L)  4.50 - 5.90 x10*6/uL Final    Hemoglobin 01/09/2025 13.8  13.5 - 17.5 g/dL Final    Hematocrit 01/09/2025 41.6  41.0 - 52.0 % Final    MCV 01/09/2025 95  80 - 100 fL Final    MCH 01/09/2025 31.7  26.0 - 34.0 pg Final    MCHC 01/09/2025 33.2  32.0 - 36.0 g/dL Final    RDW 01/09/2025 13.1  11.5 - 14.5 % Final    Platelets 01/09/2025 248  150 - 450 x10*3/uL Final    Neutrophils % 01/09/2025 55.9  40.0 - 80.0  % Final    Immature Granulocytes %, Automated 01/09/2025 0.2  0.0 - 0.9 % Final    Lymphocytes % 01/09/2025 23.5  13.0 - 44.0 % Final    Monocytes % 01/09/2025 6.0  2.0 - 10.0 % Final    Eosinophils % 01/09/2025 13.0  0.0 - 6.0 % Final    Basophils % 01/09/2025 1.4  0.0 - 2.0 % Final    Neutrophils Absolute 01/09/2025 3.47  1.20 - 7.70 x10*3/uL Final    Immature Granulocytes Absolute, Au* 01/09/2025 0.01  0.00 - 0.70 x10*3/uL Final    Lymphocytes Absolute 01/09/2025 1.46  1.20 - 4.80 x10*3/uL Final    Monocytes Absolute 01/09/2025 0.37  0.10 - 1.00 x10*3/uL Final    Eosinophils Absolute 01/09/2025 0.81 (H)  0.00 - 0.70 x10*3/uL Final    Basophils Absolute 01/09/2025 0.09  0.00 - 0.10 x10*3/uL Final    LDH 01/09/2025 144  84 - 246 U/L Final    Thyroid Stimulating Hormone 01/09/2025 1.17  0.44 - 3.98 mIU/L Final         Radiology Result:  I have reviewed the latest Imaging in PACS and the findings are noted in this note. I discussed the results of the latest imaging with the patient. All previous imaging were reviewed at the time it was completed. Full records are available in the EMR for review as well.     CT SOFT TISSUE NECK W IV CONTRAST; 1/9/2025 3:33 pm     IMPRESSION:  Status post remote right parotidectomy with expected postoperative  changes in the operative bed. No evidence of pathologic postcontrast  enhancement or recurrent/residual mass.      No cervical lymphadenopathy by size criteria.  ====================================================  CT CHEST ABDOMEN PELVIS W IV CONTRAST; 1/9/2025 3:28 pm     IMPRESSION:  Metastatic melanoma restaging scan, when compared to prior CT dated  09/26/2024:  1. No significant change without evidence of metastatic disease to  the visualized chest, abdomen, or pelvis.  2. Previously described left urothelial thickening has resolved.    Pathology Results:  I have reviewed the full pathology report recorded in the EMR. The pertinent portions indicating diagnosis are listed  "here in the note. for details please refer to the full report recorded in the EMR.    Surgical Pathology [Mohan 10 2022 2:15PM] ([id number])  Specimens: RIGHT SUPERFICIAL PAROTIDECTOMY OVER LINE FACIAL SKIN. LONG STITCH  SUPERIOR   Name MATTIE MCCULLOUGH     Accession #: [id number]   Pathologist: TYSON LARKIN MD, Board Certified   Dermatopathologist   Date of Procedure: 5/23/2022   Date Received: 5/23/2022   Date Reported 6/10/2022    Submitting Physician: JORGE POWELL MD   Location: [facility] Other External #     FINAL DIAGNOSIS   A. SPECIMEN DESIGNATED \"RIGHT SUPERFICIAL PAROTIDECTOMY OVER LINE FACIAL   SKIN\":   --METASTATIC MELANOMA INVOLVING AT LEAST ONE  OF SIXTEEN INTRAPAROTID LYMPH NODES WITH EXTRACAPSULAR EXTENSION (1/16), SEE COMMENT   --SKIN WITH AN ATYPICAL JUNCTIONAL MELANOCYTIC PROLIFERATION, LOCALIZED AREA OF DERMAL MELANOSIS AND BIOPSY SITE CHANGES, SEE COMMENT     COMMENT: Sections  show actinically altered skin with increased single mild to moderately enlarged junctional melanocytes with poor circumscription, focal areas of confluence and involvement of superficial follicular epithelium, present at peripheral margins. There is a  localized collection of dermal and subcutaneous melanophages approximately 6.5 mm in depth, involving the superior margin. There is a predominantly necrotic intraparotid mass (2.3 cm) with focal collections of severely atypical melanocytes and extensive  melanosis, present at the superficial margin and extending < 1 mm from the deep and anterior margins. Melanocytes are highlighted with SOX-10 and Melan-A. Intraparotid melanocytes and isolated junctional melanocytes are PRAME positive. (Controlsstain  adequately.)     The skin findings are concerning for regressed invasive melanoma, lentigo maligna type combined with actinic melanocytosis of sun-damaged skin and reactive melanocytic hyperplasia from the trauma of the previous procedure. The  intraparotid mass may " represent multiple matted positive lymph nodes, the exact number of which cannot be determined and is therefore counted as one positive lymph node with extracapsular extension. Results of BRAF testing will be reported separately.    Electronically Signed Out By TYSON LARKIN MD, Board Certified Dermatopathologist/SWATI   By the signature on this report, the individual or group listed as making the Final Interpretation/Diagnosis certifies that they have reviewed this case. Diagnostic  interpretation performed at [facility] [address] [facility] [address]   =============================================  Addendum Diagnosis   TEST: BRAF Exon 15 Sequencing   SPECIMEN: FFPE, Mass with anterior, superficial, deep margin, Excision,   [id number]   DISEASE  DIAGNOSIS: Metastatic Melanoma   Estimated Tumor Content: 40%   COLLECTION DATE: 05/23/2022   RECEIVED DATE: 06/13/2022   REPORT DATE: 06/17/2022     RESULT: None     DISEASE RELEVANT ALTERATIONS NOT DETECTED:   Negative for BRAF V600 mutations.      Assessment and Plan:   Assessment/Plan   Mr. Errol Das is a 59 y.o. 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 Completed one year of adjuvant Pembrolizumab 08/01/2023. He is currently on surveillance.      # Melanoma  - CT 01/09/2025 showing KAYLEN and resolution of urothelial thickening.   - RTC in 3 months on 04/15/2025.  - Labs and CT scan prior 04/10/2025.  - Continue f/u with dermatology for skin checks. Next visit with Dr. Nieto 06/09/2025.    # Arthritis  - Continue follow up with rheumatology. Next visit 05/12/2025.     # Hypothyroidism   - TSH 1.17.  - Continue levothyroxine 112mcg PO daily.      DISCLAIMER:   In preparing for this visit and writing this note, I reviewed all the previous electronic medical records (labs, imaging and medical charts) of the patient available in the physician portal.  Significant findings which helped in decision making are recorded  in this chart.     The plan was discussed with the patient. We gave him ample opportunities to ask questions. All questions were answered to his satisfaction and he verbalized understanding.      INSTRUCTIONS FOR PATIENT  Mr. Errol Das ,  It was a pleasure talking to you today.    As discussed, we will be conducting surveillance scans in 3 months on 04/10/2025. We will meet again on 04/15/2025. Please keep your appointments with dermatology and surgical oncology.     Please make sure to schedule your appointments as we discussed. Your appointments should also appear in your MyChart.   In case of an emergency please dial 911 or report to your nearest Emergency Room.  For all other questions, please do not hesitate to reach out to us at the number listed below.    Thank you for choosing [facility] at [facility].   We appreciate your visit.     MD Alisson Clifford APRN Laura Piette, RN     Sarcoma and Cutaneous Oncology team    Phone: [phone]  Fax: [phone].

## 2025-02-03 ENCOUNTER — APPOINTMENT (OUTPATIENT)
Dept: OPHTHALMOLOGY | Facility: CLINIC | Age: 60
End: 2025-02-03
Payer: COMMERCIAL

## 2025-02-03 DIAGNOSIS — H52.4 HYPEROPIA WITH PRESBYOPIA OF BOTH EYES: ICD-10-CM

## 2025-02-03 DIAGNOSIS — H52.03 HYPEROPIA WITH PRESBYOPIA OF BOTH EYES: ICD-10-CM

## 2025-02-03 DIAGNOSIS — Z79.899 LONG-TERM USE OF HYDROXYCHLOROQUINE: Primary | ICD-10-CM

## 2025-02-03 PROCEDURE — 92134 CPTRZ OPH DX IMG PST SGM RTA: CPT | Performed by: OPTOMETRIST

## 2025-02-03 PROCEDURE — 92004 COMPRE OPH EXAM NEW PT 1/>: CPT | Performed by: OPTOMETRIST

## 2025-02-03 PROCEDURE — 92083 EXTENDED VISUAL FIELD XM: CPT | Performed by: OPTOMETRIST

## 2025-02-03 ASSESSMENT — REFRACTION_MANIFEST
OD_CYLINDER: -0.25
OD_SPHERE: +0.75
OD_SPHERE: +0.50
OD_ADD: +2.00
OS_CYLINDER: -0.25
OS_AXIS: 005
OS_SPHERE: +0.25
OS_ADD: +2.00
OS_SPHERE: PLANO
OD_AXIS: 030
OD_CYLINDER: SPHERE
OS_CYLINDER: SPHERE
METHOD_AUTOREFRACTION: 1
OD_AXIS: 030

## 2025-02-03 ASSESSMENT — ENCOUNTER SYMPTOMS
EYES NEGATIVE: 0
PSYCHIATRIC NEGATIVE: 0
NEUROLOGICAL NEGATIVE: 0
HEMATOLOGIC/LYMPHATIC NEGATIVE: 0
ALLERGIC/IMMUNOLOGIC NEGATIVE: 0
ENDOCRINE NEGATIVE: 0
MUSCULOSKELETAL NEGATIVE: 0
CARDIOVASCULAR NEGATIVE: 0
RESPIRATORY NEGATIVE: 0
CONSTITUTIONAL NEGATIVE: 0
GASTROINTESTINAL NEGATIVE: 0

## 2025-02-03 ASSESSMENT — SLIT LAMP EXAM - LIDS
COMMENTS: NORMAL
COMMENTS: NORMAL

## 2025-02-03 ASSESSMENT — VISUAL ACUITY
OS_SC: 20/25
METHOD: SNELLEN - LINEAR
OD_SC+: +1
OD_SC: 20/25

## 2025-02-03 ASSESSMENT — CONF VISUAL FIELD
OS_NORMAL: 1
OD_INFERIOR_NASAL_RESTRICTION: 0
OD_INFERIOR_TEMPORAL_RESTRICTION: 0
OD_SUPERIOR_TEMPORAL_RESTRICTION: 0
OD_NORMAL: 1
OS_INFERIOR_NASAL_RESTRICTION: 0
OS_SUPERIOR_NASAL_RESTRICTION: 0
OS_SUPERIOR_TEMPORAL_RESTRICTION: 0
OD_SUPERIOR_NASAL_RESTRICTION: 0
OS_INFERIOR_TEMPORAL_RESTRICTION: 0

## 2025-02-03 ASSESSMENT — TONOMETRY
OD_IOP_MMHG: 13
OS_IOP_MMHG: 14
IOP_METHOD: TONOPEN

## 2025-02-03 ASSESSMENT — EXTERNAL EXAM - RIGHT EYE: OD_EXAM: NORMAL

## 2025-02-03 ASSESSMENT — CUP TO DISC RATIO
OS_RATIO: 0.4
OD_RATIO: 0.4

## 2025-02-03 ASSESSMENT — EXTERNAL EXAM - LEFT EYE: OS_EXAM: NORMAL

## 2025-02-03 NOTE — ASSESSMENT & PLAN NOTE
Pt currently taking 400mg weekday and 200mg weekend plaquenil for inflammatory arthritis.  -Current weight 160lb  -Daily dosage: 4.72mg/kg  -Baseline mac OCT - WNL  -Baseline 10-2: OD - Scattered generalized defects, high FL. OS: No defects.  -DFE unremarkable OU  Pt educated on findings. Monitor annually for changes with DFE/mac OCT/HVF 10-2. Continue care with rheumatology/PCP as directed. Pt voiced understanding.

## 2025-02-03 NOTE — PROGRESS NOTES
Assessment/Plan   Problem List Items Addressed This Visit       Hyperopia with presbyopia of both eyes     Mild Rx. Pt currently only using OTC readers. Hx of LASIK OU.  Pt educated on findings. Continue with OTC readers for near only. Monitor annually. Pt voiced understanding.          Long-term use of hydroxychloroquine - Primary     Pt currently taking 400mg weekday and 200mg weekend plaquenil for inflammatory arthritis.  -Current weight 160lb  -Daily dosage: 4.72mg/kg  -Baseline mac OCT - WNL  -Baseline 10-2: OD - Scattered generalized defects, high FL. OS: No defects.  -DFE unremarkable OU  Pt educated on findings. Monitor annually for changes with DFE/mac OCT/HVF 10-2. Continue care with rheumatology/PCP as directed. Pt voiced understanding.          Relevant Orders    OCT, Retina - OU - Both Eyes (Completed)    Hollins Visual Field - OU - Both Eyes (Completed)

## 2025-02-03 NOTE — ASSESSMENT & PLAN NOTE
Mild Rx. Pt currently only using OTC readers. Hx of LASIK OU.  Pt educated on findings. Continue with OTC readers for near only. Monitor annually. Pt voiced understanding.

## 2025-04-07 DIAGNOSIS — C79.89 SECONDARY MALIGNANT NEOPLASM OF OTHER SPECIFIED SITES: Primary | ICD-10-CM

## 2025-04-08 ENCOUNTER — HOSPITAL ENCOUNTER (OUTPATIENT)
Dept: RADIOLOGY | Facility: HOSPITAL | Age: 60
Discharge: HOME | End: 2025-04-08
Payer: COMMERCIAL

## 2025-04-08 ENCOUNTER — LAB (OUTPATIENT)
Dept: LAB | Facility: HOSPITAL | Age: 60
End: 2025-04-08
Payer: COMMERCIAL

## 2025-04-08 DIAGNOSIS — C77.9 MELANOMA METASTATIC TO LYMPH NODE (MULTI): ICD-10-CM

## 2025-04-08 DIAGNOSIS — C79.89 METASTATIC MELANOMA TO PAROTID GLAND (MULTI): ICD-10-CM

## 2025-04-08 DIAGNOSIS — E03.9 HYPOTHYROIDISM, UNSPECIFIED TYPE: ICD-10-CM

## 2025-04-08 DIAGNOSIS — C79.89 SECONDARY MALIGNANT NEOPLASM OF OTHER SPECIFIED SITES: ICD-10-CM

## 2025-04-08 LAB
ALBUMIN SERPL BCP-MCNC: 4.8 G/DL (ref 3.4–5)
ALP SERPL-CCNC: 51 U/L (ref 33–120)
ALT SERPL W P-5'-P-CCNC: 12 U/L (ref 10–52)
ANION GAP SERPL CALC-SCNC: 14 MMOL/L (ref 10–20)
AST SERPL W P-5'-P-CCNC: 19 U/L (ref 9–39)
BASOPHILS # BLD AUTO: 0.1 X10*3/UL (ref 0–0.1)
BASOPHILS NFR BLD AUTO: 1.6 %
BILIRUB SERPL-MCNC: 0.6 MG/DL (ref 0–1.2)
BUN SERPL-MCNC: 14 MG/DL (ref 6–23)
CALCIUM SERPL-MCNC: 9.9 MG/DL (ref 8.6–10.3)
CHLORIDE SERPL-SCNC: 104 MMOL/L (ref 98–107)
CO2 SERPL-SCNC: 25 MMOL/L (ref 21–32)
CREAT SERPL-MCNC: 0.79 MG/DL (ref 0.5–1.3)
EGFRCR SERPLBLD CKD-EPI 2021: >90 ML/MIN/1.73M*2
EOSINOPHIL # BLD AUTO: 0.28 X10*3/UL (ref 0–0.7)
EOSINOPHIL NFR BLD AUTO: 4.6 %
ERYTHROCYTE [DISTWIDTH] IN BLOOD BY AUTOMATED COUNT: 13.4 % (ref 11.5–14.5)
GLUCOSE SERPL-MCNC: 93 MG/DL (ref 74–99)
HCT VFR BLD AUTO: 41.3 % (ref 41–52)
HGB BLD-MCNC: 13.6 G/DL (ref 13.5–17.5)
IMM GRANULOCYTES # BLD AUTO: 0.02 X10*3/UL (ref 0–0.7)
IMM GRANULOCYTES NFR BLD AUTO: 0.3 % (ref 0–0.9)
LDH SERPL L TO P-CCNC: 150 U/L (ref 84–246)
LYMPHOCYTES # BLD AUTO: 1.85 X10*3/UL (ref 1.2–4.8)
LYMPHOCYTES NFR BLD AUTO: 30.1 %
MCH RBC QN AUTO: 31 PG (ref 26–34)
MCHC RBC AUTO-ENTMCNC: 32.9 G/DL (ref 32–36)
MCV RBC AUTO: 94 FL (ref 80–100)
MONOCYTES # BLD AUTO: 0.51 X10*3/UL (ref 0.1–1)
MONOCYTES NFR BLD AUTO: 8.3 %
NEUTROPHILS # BLD AUTO: 3.39 X10*3/UL (ref 1.2–7.7)
NEUTROPHILS NFR BLD AUTO: 55.1 %
NRBC BLD-RTO: 0 /100 WBCS (ref 0–0)
PLATELET # BLD AUTO: 246 X10*3/UL (ref 150–450)
POTASSIUM SERPL-SCNC: 4 MMOL/L (ref 3.5–5.3)
PROT SERPL-MCNC: 7.2 G/DL (ref 6.4–8.2)
RBC # BLD AUTO: 4.39 X10*6/UL (ref 4.5–5.9)
SODIUM SERPL-SCNC: 139 MMOL/L (ref 136–145)
TSH SERPL-ACNC: 2.42 MIU/L (ref 0.44–3.98)
WBC # BLD AUTO: 6.2 X10*3/UL (ref 4.4–11.3)

## 2025-04-08 PROCEDURE — 85025 COMPLETE CBC W/AUTO DIFF WBC: CPT

## 2025-04-08 PROCEDURE — 36415 COLL VENOUS BLD VENIPUNCTURE: CPT

## 2025-04-08 PROCEDURE — 80053 COMPREHEN METABOLIC PANEL: CPT

## 2025-04-08 PROCEDURE — 70491 CT SOFT TISSUE NECK W/DYE: CPT

## 2025-04-08 PROCEDURE — 84443 ASSAY THYROID STIM HORMONE: CPT

## 2025-04-08 PROCEDURE — 71260 CT THORAX DX C+: CPT

## 2025-04-08 PROCEDURE — 83615 LACTATE (LD) (LDH) ENZYME: CPT

## 2025-04-08 PROCEDURE — 2550000001 HC RX 255 CONTRASTS: Performed by: NURSE PRACTITIONER

## 2025-04-08 RX ADMIN — IOHEXOL 70 ML: 350 INJECTION, SOLUTION INTRAVENOUS at 14:07

## 2025-04-08 RX ADMIN — IOHEXOL 50 ML: 350 INJECTION, SOLUTION INTRAVENOUS at 14:14

## 2025-04-10 ENCOUNTER — APPOINTMENT (OUTPATIENT)
Dept: RADIOLOGY | Facility: HOSPITAL | Age: 60
End: 2025-04-10
Payer: COMMERCIAL

## 2025-04-15 ENCOUNTER — OFFICE VISIT (OUTPATIENT)
Dept: HEMATOLOGY/ONCOLOGY | Facility: HOSPITAL | Age: 60
End: 2025-04-15
Payer: COMMERCIAL

## 2025-04-15 VITALS
BODY MASS INDEX: 24.54 KG/M2 | OXYGEN SATURATION: 100 % | TEMPERATURE: 97 F | RESPIRATION RATE: 20 BRPM | WEIGHT: 161.38 LBS | HEART RATE: 54 BPM | DIASTOLIC BLOOD PRESSURE: 63 MMHG | SYSTOLIC BLOOD PRESSURE: 111 MMHG

## 2025-04-15 DIAGNOSIS — C77.9 MELANOMA METASTATIC TO LYMPH NODE (MULTI): ICD-10-CM

## 2025-04-15 DIAGNOSIS — E03.9 HYPOTHYROIDISM, UNSPECIFIED TYPE: ICD-10-CM

## 2025-04-15 DIAGNOSIS — C79.89 METASTATIC MELANOMA TO PAROTID GLAND (MULTI): Primary | ICD-10-CM

## 2025-04-15 DIAGNOSIS — Z92.89 HISTORY OF IMMUNOTHERAPY: ICD-10-CM

## 2025-04-15 PROCEDURE — 99214 OFFICE O/P EST MOD 30 MIN: CPT | Performed by: INTERNAL MEDICINE

## 2025-04-15 PROCEDURE — 1036F TOBACCO NON-USER: CPT | Performed by: INTERNAL MEDICINE

## 2025-04-15 ASSESSMENT — PROMIS GLOBAL HEALTH SCALE
RATE_AVERAGE_PAIN: 0
RATE_SOCIAL_SATISFACTION: VERY GOOD
RATE_MENTAL_HEALTH: VERY GOOD
EMOTIONAL_PROBLEMS: RARELY
RATE_GENERAL_HEALTH: EXCELLENT
CARRYOUT_SOCIAL_ACTIVITIES: VERY GOOD
RATE_PHYSICAL_HEALTH: EXCELLENT
RATE_QUALITY_OF_LIFE: EXCELLENT
CARRYOUT_PHYSICAL_ACTIVITIES: COMPLETELY

## 2025-04-15 ASSESSMENT — PAIN SCALES - GENERAL: PAINLEVEL_OUTOF10: 0-NO PAIN

## 2025-04-15 NOTE — LETTER
April 18, 2025     Adithya Bhat MD  [address]  [facility] [address]    Patient: Errol Das   YOB: 1965   Date of Visit: 4/15/2025       Dear Dr. Adithya Bhat MD:    Thank you for referring Errol Das to me for evaluation. Below are my notes for this consultation.  If you have questions, please do not hesitate to call me. I look forward to following your patient along with you.       Sincerely,     Ilir Esteves MD      CC: No Recipients  ______________________________________________________________________________________    .Patient ID: Errol Das is a 60 y.o. male.  Referring Physician: Alisson Fishman, APRN-CNP  [address] [place] [address]  Primary Care Provider: No primary care provider on file.     Chief Complaint   Patient presents with   • Follow-up     Review CT scan. Review thyroid medicine. Review plaquenil dosing.    • Skin Cancer     Clinical stage III resected.         Oncology History   Melanoma of skin (Multi)   4/22/2022 Cancer Staged    Staging form: Melanoma of the Skin, AJCC 8th Edition, Pathologic stage from 4/22/2022: Stage Unknown (pTX, pN1b, cM0) - Signed by Ilir Esteves MD on 4/18/2025 12/26/2023 Initial Diagnosis    Melanoma of skin (Multi)         Mr. Errol Das is referred to our clinic by Dr. 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 He met with our team on 5/31/22.      Mr. Das met with Dr. Bhat on 4/15/22. He was referred by his PCP after the patient told them about a lump in the parotid gland region since beginning of 2022. the lump had grown significantly. He underwent FNAC on 4/28/22 and the path read  melanoma. Then he was taken to OR on 5/23/22 and underwent resection. Pathology report reads 1/16 positive nodes. The lone node has 6.5mm deposit with MELANIE. The parotid mass is necrotic and measures 2.3cm. Negative for BRAF V600.    "  PET scan (on 7/11/22) and MRI brain (7/14/22) did not show any activity concerning for tumors. We recommend adjuvant pembrolizumab. Patient consented on 7/19/22 and started Keytruda 400mg every 6 weeks on 7/26/22. He completed one year of therapy on 08/01/2023 and is currently on surveillance.      Treatment History:   Systemic treatment  1. Pembrolizumab 400mg every 6 weeks- s/p 9 cycles as of 08/01/2023     irAE's  1. Hypothyroidism (currently on Synthroid 112mcg PO daily).       Subjective  Please refer to \"Notes/Cancer History\" above for complete History of present illness.     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 He still has pain in the right knee that is not affected by the use of plaquenil. He does receive steroid injections and the thing that really helps him is actually draining the fluid from the joints. He is working with his rheumatologist to see what can be done about the use of plaquenil. He is aware that immunosuppression can lead to resurgence of the melanoma. Apart from the knee, he is compliant with his thyroid medicine and is tolerating the drug well. He is actively looking in to establishing care with PCP.     Review of Systems:   Review of Systems   Constitutional:  Negative for appetite change, chills, fatigue and fever.   Eyes:  Negative for eye problems and icterus.   Cardiovascular:  Negative for chest pain, leg swelling and palpitations.   Gastrointestinal:  Negative for abdominal pain, constipation, diarrhea, nausea and vomiting.   Endocrine: Negative for hot flashes.   Genitourinary:  Negative for difficulty urinating, dysuria, frequency and hematuria.    Musculoskeletal:  Negative for back pain, flank pain, gait problem and myalgias.   Skin:  Negative for itching and rash.   Neurological:  Negative for dizziness, extremity weakness, gait problem and seizures.   Hematological:  Negative for adenopathy.   Psychiatric/Behavioral:  Negative for confusion and depression. The patient is not " nervous/anxious.          MEDICAL HISTORY  Past Medical History:   Diagnosis Date   • Insomnia    • Malignant melanoma of skin of neck (Multi)    • Melanoma metastatic to lymph node (Multi)    • Seronegative arthritis        FAMILY HISTORY  Family History   Problem Relation Name Age of Onset   • No Known Problems Mother     • Melanoma Father     • No Known Problems Sister         TOBACCO HISTORY  Tobacco Use: Low Risk  (4/15/2025)    Patient History    • Smoking Tobacco Use: Never    • Smokeless Tobacco Use: Never    • Passive Exposure: Never       SOCIAL HISTORY  Social Connections: Not on file        Outpatient Medication Profile:  Medications Ordered Prior to Encounter[1]      Performance Status:  Asymptomatic     Vitals and Measurements:   /63   Pulse 54   Temp 36.1 °C (97 °F) (Core)   Resp 20   Wt 73.2 kg (161 lb 6 oz)   SpO2 100%   BMI 24.54 kg/m²       Physical Exam:   Physical Exam  Constitutional:       General: He is not in acute distress.     Appearance: Normal appearance. He is not ill-appearing.   HENT:      Head: Normocephalic and atraumatic.   Eyes:      Extraocular Movements: Extraocular movements intact.      Conjunctiva/sclera: Conjunctivae normal.      Pupils: Pupils are equal, round, and reactive to light.   Neck:      Comments: Well healed scar seen on the right side of the neck.   Cardiovascular:      Rate and Rhythm: Normal rate and regular rhythm.      Pulses: Normal pulses.   Pulmonary:      Effort: Pulmonary effort is normal.      Breath sounds: No wheezing, rhonchi or rales.   Abdominal:      General: Abdomen is flat. Bowel sounds are normal.      Palpations: There is no mass.      Tenderness: There is no abdominal tenderness. There is no rebound.   Musculoskeletal:         General: Normal range of motion.      Cervical back: Neck supple.   Skin:     General: Skin is warm.   Neurological:      General: No focal deficit present.      Mental Status: He is alert and oriented to  person, place, and time.   Psychiatric:         Mood and Affect: Mood normal.         Behavior: Behavior normal.         Thought Content: Thought content normal.         Judgment: Judgment normal.              Lab Results:  I have reviewed these laboratory results:     Lab on 04/08/2025   Component Date Value Ref Range Status   • WBC 04/08/2025 6.2  4.4 - 11.3 x10*3/uL Final   • nRBC 04/08/2025 0.0  0.0 - 0.0 /100 WBCs Final   • RBC 04/08/2025 4.39 (L)  4.50 - 5.90 x10*6/uL Final   • Hemoglobin 04/08/2025 13.6  13.5 - 17.5 g/dL Final   • Hematocrit 04/08/2025 41.3  41.0 - 52.0 % Final   • MCV 04/08/2025 94  80 - 100 fL Final   • MCH 04/08/2025 31.0  26.0 - 34.0 pg Final   • MCHC 04/08/2025 32.9  32.0 - 36.0 g/dL Final   • RDW 04/08/2025 13.4  11.5 - 14.5 % Final   • Platelets 04/08/2025 246  150 - 450 x10*3/uL Final   • Neutrophils % 04/08/2025 55.1  40.0 - 80.0 % Final   • Immature Granulocytes %, Automated 04/08/2025 0.3  0.0 - 0.9 % Final   • Lymphocytes % 04/08/2025 30.1  13.0 - 44.0 % Final   • Monocytes % 04/08/2025 8.3  2.0 - 10.0 % Final   • Eosinophils % 04/08/2025 4.6  0.0 - 6.0 % Final   • Basophils % 04/08/2025 1.6  0.0 - 2.0 % Final   • Neutrophils Absolute 04/08/2025 3.39  1.20 - 7.70 x10*3/uL Final   • Immature Granulocytes Absolute, Au* 04/08/2025 0.02  0.00 - 0.70 x10*3/uL Final   • Lymphocytes Absolute 04/08/2025 1.85  1.20 - 4.80 x10*3/uL Final   • Monocytes Absolute 04/08/2025 0.51  0.10 - 1.00 x10*3/uL Final   • Eosinophils Absolute 04/08/2025 0.28  0.00 - 0.70 x10*3/uL Final   • Basophils Absolute 04/08/2025 0.10  0.00 - 0.10 x10*3/uL Final   • Glucose 04/08/2025 93  74 - 99 mg/dL Final   • Sodium 04/08/2025 139  136 - 145 mmol/L Final   • Potassium 04/08/2025 4.0  3.5 - 5.3 mmol/L Final   • Chloride 04/08/2025 104  98 - 107 mmol/L Final   • Bicarbonate 04/08/2025 25  21 - 32 mmol/L Final   • Anion Gap 04/08/2025 14  10 - 20 mmol/L Final   • Urea Nitrogen 04/08/2025 14  6 - 23 mg/dL Final   •  Creatinine 04/08/2025 0.79  0.50 - 1.30 mg/dL Final   • eGFR 04/08/2025 >90  >60 mL/min/1.73m*2 Final   • Calcium 04/08/2025 9.9  8.6 - 10.3 mg/dL Final   • Albumin 04/08/2025 4.8  3.4 - 5.0 g/dL Final   • Alkaline Phosphatase 04/08/2025 51  33 - 120 U/L Final   • Total Protein 04/08/2025 7.2  6.4 - 8.2 g/dL Final   • AST 04/08/2025 19  9 - 39 U/L Final   • Bilirubin, Total 04/08/2025 0.6  0.0 - 1.2 mg/dL Final   • ALT 04/08/2025 12  10 - 52 U/L Final   • LDH 04/08/2025 150  84 - 246 U/L Final   • Thyroid Stimulating Hormone 04/08/2025 2.42  0.44 - 3.98 mIU/L Final         Radiology Result:  I have reviewed the latest Imaging in PACS and the findings are noted in this note. I discussed the results of the latest imaging with the patient. All previous imaging were reviewed at the time it was completed. Full records are available in the EMR for review as well.     CT chest abdomen pelvis w IV contrast  Result Date: 4/14/2025  Impression: *Melanoma restaging scan compared to 01/09/2025. No evidence of metastatic disease in the chest abdomen or pelvis. *Additional stable chronic and incidental findings described above.     I personally reviewed the images/study and I agree with the findings as stated by Resident Hieu Soto.   MACRO: None.   Signed by: Briana Lawrence 4/14/2025 6:00 PM Dictation workstation:   [id number]    CT soft tissue neck w IV contrast  Result Date: 4/8/2025  Impression: 1. Stable examination. Stable postsurgical changes from right parotidectomy without evidence of residual/recurrent disease. 2. No pathologic lymphadenopathy by imaging criteria.   MACRO: None   Signed by: Marycruz Gomez 4/8/2025 2:44 PM Dictation workstation:   [id number]        Pathology Results:  I have reviewed the full pathology report recorded in the EMR. The pertinent portions indicating diagnosis are listed here in the note. for details please refer to the full report recorded in the EMR.    Surgical Pathology [Mohan 10 2022 2:15PM]  "([id number])     Specimens: RIGHT SUPERFICIAL PAROTIDECTOMY OVER LINE FACIAL SKIN. LONG STITCH SUPERIOR     Name MATTIE MCCULLOUGH     Accession #: [id number]   Pathologist: TYSON LARKIN MD, Board Certified   Dermatopathologist   Date of Procedure:  5/23/2022   Date Received: 5/23/2022   Date Reported 6/10/2022   Submitting Physician: JORGE POWELL MD   Location: [facility] Other External #       FINAL DIAGNOSIS   A. SPECIMEN DESIGNATED \"RIGHT SUPERFICIAL PAROTIDECTOMY OVER  LINE FACIAL   SKIN\":   --METASTATIC MELANOMA INVOLVING AT LEAST ONE OF SIXTEEN INTRAPAROTID LYMPH NODES WITH EXTRACAPSULAR EXTENSION (1/16), SEE COMMENT   --SKIN WITH AN ATYPICAL JUNCTIONAL MELANOCYTIC PROLIFERATION, LOCALIZED AREA OF DERMAL  MELANOSIS AND BIOPSY SITE CHANGES, SEE COMMENT     COMMENT: Sections show actinically altered skin with increased single mild to moderately enlarged junctional melanocytes with poor circumscription, focal areas of confluence and involvement of  superficial follicular epithelium, present at peripheral margins. There is a localized collection of dermal and subcutaneous melanophages approximately 6.5 mm in depth, involving the superior margin. There is a predominantly necrotic intraparotid mass  (2.3 cm) with focal collections of severely atypical melanocytes and extensive melanosis, present at the superficial margin and extending < 1 mm from the deep and anterior margins. Melanocytes are highlighted with SOX-10 and Melan-A. Intraparotid melanocytes  and isolated junctional melanocytes are PRAME positive. (Controlsstain adequately.)     The skin findings are concerning for regressed invasive melanoma, lentigo maligna type combined with actinic melanocytosis of sun-damaged skin and reactive  melanocytic hyperplasia from the trauma of the previous procedure. The intraparotid mass may represent multiple matted positive lymph nodes, the exact number of which cannot be determined and is therefore counted as one " positive lymph node with extracapsular  extension. Results of BRAF testing will be reported separately.     If clinically appropriate, further reexcision of the surgical site is recommended.     : Dr. Jason Matthew     The gross and/or microscopic findings  were reviewed in conjunction with pathology resident, Kassie Panda MD.     Electronically Signed Out By TYSON LARKIN MD, Board Certified Dermatopathologist/SWATI   By the signature on this report, the individual or group listed as making  the Final Interpretation/Diagnosis certifies that they have reviewed this case. Diagnostic interpretation performed at [facility] [address] 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 Please see the evolution of the case listed above in the cancer history.     Today,   We reviewed the scans together in the clinic. I reviewed the report with the patient. I am concerned about the diagnosis of IO-related arthritis. Mr. Errol Das has a very high risk melanoma which is high risk of relapse. He is on immunosuppression with plaquenil which increases risk of relapse as well as other skin cancers. I discussed this with the patient. This is the reason he has been getting more frequent CT scans, but I would like to relax this after 3 years from the last surgery. I will go on to 6-month scans now. He will discuss this situation and plaquenil with his rheumatologist as well.     # Stage III melanoma s/p resection and adjuvant therapy.   - CT scan neck, chest, abdomen, and pelvis with contrast in 6 months with labs.   - Visit after the scans and labs.     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 I also recommend that patient be taken off immunosuppression, especially if this is not  helping him. Local steroid injections are OK.   - Patient will discuss this with his rheumatologist.     # IO-related hypothyroidism  - Continue levothyroxine at 112mcg po daily.   - Patient advised to establish a PCP who will take over managing this drug in the future.      DISCLAIMER:   In preparing for this visit and writing this note, I reviewed all the previous electronic medical records (labs, imaging and medical charts) of the patient available in the physician portal. Significant findings which helped in decision making are recorded  in this chart.     The plan was discussed with the patient. We gave him ample opportunities to ask questions. All questions were answered to his satisfaction and he verbalized understanding.      Ilir Esteves MD    Division of Hematology and Oncology  [facility]    Co-Director Sarcoma and Cutaneous Oncology  [facility]    Phone: [phone]  Fax: [phone]       [1]  Current Outpatient Medications on File Prior to Visit   Medication Sig Dispense Refill   • hydroxychloroquine (Plaquenil) 200 mg tablet Take 2 tablets (400 mg) by mouth once daily. 90 tablet 1   • levothyroxine (Synthroid, Levoxyl) 112 mcg tablet Take 1 tablet (112 mcg) by mouth once daily in the morning. Take before meals. 30 tablet 11     No current facility-administered medications on file prior to visit.        [1]  Current Outpatient Medications on File Prior to Visit   Medication Sig Dispense Refill   • hydroxychloroquine (Plaquenil) 200 mg tablet Take 2 tablets (400 mg) by mouth once daily. 90 tablet 1   • levothyroxine (Synthroid, Levoxyl) 112 mcg tablet Take 1 tablet (112 mcg) by mouth once daily in the morning. Take before meals. 30 tablet 11     No current facility-administered medications on file prior to visit.

## 2025-04-18 ASSESSMENT — ENCOUNTER SYMPTOMS
MYALGIAS: 0
VOMITING: 0
SEIZURES: 0
EYE PROBLEMS: 0
DIZZINESS: 0
LEG SWELLING: 0
HOT FLASHES: 0
APPETITE CHANGE: 0
FREQUENCY: 0
BACK PAIN: 0
CONFUSION: 0
FATIGUE: 0
HEMATURIA: 0
NERVOUS/ANXIOUS: 0
CONSTIPATION: 0
PALPITATIONS: 0
DYSURIA: 0
FEVER: 0
DIARRHEA: 0
FLANK PAIN: 0
SCLERAL ICTERUS: 0
DEPRESSION: 0
DIFFICULTY URINATING: 0
CHILLS: 0
ABDOMINAL PAIN: 0
NAUSEA: 0
EXTREMITY WEAKNESS: 0
ADENOPATHY: 0

## 2025-04-18 NOTE — PROGRESS NOTES
".Patient ID: Errol Das is a 60 y.o. male.  Referring Physician: Alisson Fishman, APRN-CNP  [id number] [place] [address]  Primary Care Provider: No primary care provider on file.     Chief Complaint   Patient presents with    Follow-up     Review CT scan. Review thyroid medicine. Review plaquenil dosing.     Skin Cancer     Clinical stage III resected.         Oncology History   Melanoma of skin (Multi)   4/22/2022 Cancer Staged    Staging form: Melanoma of the Skin, AJCC 8th Edition, Pathologic stage from 4/22/2022: Stage Unknown (pTX, pN1b, cM0) - Signed by Ilir Esteves MD on 4/18/2025 12/26/2023 Initial Diagnosis    Melanoma of skin (Multi)         Mr. Errol Das is referred to our clinic by Dr. 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 He met with our team on 5/31/22.      Mr. Das met with Dr. Bhat on 4/15/22. He was referred by his PCP after the patient told them about a lump in the parotid gland region since beginning of 2022. the lump had grown significantly. He underwent FNAC on 4/28/22 and the path read  melanoma. Then he was taken to OR on 5/23/22 and underwent resection. Pathology report reads 1/16 positive nodes. The lone node has 6.5mm deposit with MELANIE. The parotid mass is necrotic and measures 2.3cm. Negative for BRAF V600.     PET scan (on 7/11/22) and MRI brain (7/14/22) did not show any activity concerning for tumors. We recommend adjuvant pembrolizumab. Patient consented on 7/19/22 and started Keytruda 400mg every 6 weeks on 7/26/22. He completed one year of therapy on 08/01/2023 and is currently on surveillance.      Treatment History:   Systemic treatment  1. Pembrolizumab 400mg every 6 weeks- s/p 9 cycles as of 08/01/2023     irAE's  1. Hypothyroidism (currently on Synthroid 112mcg PO daily).       Subjective   Please refer to \"Notes/Cancer History\" above for complete History of present illness.     Dr Norton " Pippa is doing well. He still has pain in the right knee that is not affected by the use of plaquenil. He does receive steroid injections and the thing that really helps him is actually draining the fluid from the joints. He is working with his rheumatologist to see what can be done about the use of plaquenil. He is aware that immunosuppression can lead to resurgence of the melanoma. Apart from the knee, he is compliant with his thyroid medicine and is tolerating the drug well. He is actively looking in to establishing care with PCP.     Review of Systems:   Review of Systems   Constitutional:  Negative for appetite change, chills, fatigue and fever.   Eyes:  Negative for eye problems and icterus.   Cardiovascular:  Negative for chest pain, leg swelling and palpitations.   Gastrointestinal:  Negative for abdominal pain, constipation, diarrhea, nausea and vomiting.   Endocrine: Negative for hot flashes.   Genitourinary:  Negative for difficulty urinating, dysuria, frequency and hematuria.    Musculoskeletal:  Negative for back pain, flank pain, gait problem and myalgias.   Skin:  Negative for itching and rash.   Neurological:  Negative for dizziness, extremity weakness, gait problem and seizures.   Hematological:  Negative for adenopathy.   Psychiatric/Behavioral:  Negative for confusion and depression. The patient is not nervous/anxious.          MEDICAL HISTORY  Past Medical History:   Diagnosis Date    Insomnia     Malignant melanoma of skin of neck (Multi)     Melanoma metastatic to lymph node (Multi)     Seronegative arthritis        FAMILY HISTORY  Family History   Problem Relation Name Age of Onset    No Known Problems Mother      Melanoma Father      No Known Problems Sister         TOBACCO HISTORY  Tobacco Use: Low Risk  (4/15/2025)    Patient History     Smoking Tobacco Use: Never     Smokeless Tobacco Use: Never     Passive Exposure: Never       SOCIAL HISTORY  Social Connections: Not on file         Outpatient Medication Profile:  Medications Ordered Prior to Encounter[1]      Performance Status:  Asymptomatic     Vitals and Measurements:   /63   Pulse 54   Temp 36.1 °C (97 °F) (Core)   Resp 20   Wt 73.2 kg (161 lb 6 oz)   SpO2 100%   BMI 24.54 kg/m²       Physical Exam:   Physical Exam  Constitutional:       General: He is not in acute distress.     Appearance: Normal appearance. He is not ill-appearing.   HENT:      Head: Normocephalic and atraumatic.   Eyes:      Extraocular Movements: Extraocular movements intact.      Conjunctiva/sclera: Conjunctivae normal.      Pupils: Pupils are equal, round, and reactive to light.   Neck:      Comments: Well healed scar seen on the right side of the neck.   Cardiovascular:      Rate and Rhythm: Normal rate and regular rhythm.      Pulses: Normal pulses.   Pulmonary:      Effort: Pulmonary effort is normal.      Breath sounds: No wheezing, rhonchi or rales.   Abdominal:      General: Abdomen is flat. Bowel sounds are normal.      Palpations: There is no mass.      Tenderness: There is no abdominal tenderness. There is no rebound.   Musculoskeletal:         General: Normal range of motion.      Cervical back: Neck supple.   Skin:     General: Skin is warm.   Neurological:      General: No focal deficit present.      Mental Status: He is alert and oriented to person, place, and time.   Psychiatric:         Mood and Affect: Mood normal.         Behavior: Behavior normal.         Thought Content: Thought content normal.         Judgment: Judgment normal.              Lab Results:  I have reviewed these laboratory results:     Lab on 04/08/2025   Component Date Value Ref Range Status    WBC 04/08/2025 6.2  4.4 - 11.3 x10*3/uL Final    nRBC 04/08/2025 0.0  0.0 - 0.0 /100 WBCs Final    RBC 04/08/2025 4.39 (L)  4.50 - 5.90 x10*6/uL Final    Hemoglobin 04/08/2025 13.6  13.5 - 17.5 g/dL Final    Hematocrit 04/08/2025 41.3  41.0 - 52.0 % Final    MCV 04/08/2025 94   80 - 100 fL Final    MCH 04/08/2025 31.0  26.0 - 34.0 pg Final    MCHC 04/08/2025 32.9  32.0 - 36.0 g/dL Final    RDW 04/08/2025 13.4  11.5 - 14.5 % Final    Platelets 04/08/2025 246  150 - 450 x10*3/uL Final    Neutrophils % 04/08/2025 55.1  40.0 - 80.0 % Final    Immature Granulocytes %, Automated 04/08/2025 0.3  0.0 - 0.9 % Final    Lymphocytes % 04/08/2025 30.1  13.0 - 44.0 % Final    Monocytes % 04/08/2025 8.3  2.0 - 10.0 % Final    Eosinophils % 04/08/2025 4.6  0.0 - 6.0 % Final    Basophils % 04/08/2025 1.6  0.0 - 2.0 % Final    Neutrophils Absolute 04/08/2025 3.39  1.20 - 7.70 x10*3/uL Final    Immature Granulocytes Absolute, Au* 04/08/2025 0.02  0.00 - 0.70 x10*3/uL Final    Lymphocytes Absolute 04/08/2025 1.85  1.20 - 4.80 x10*3/uL Final    Monocytes Absolute 04/08/2025 0.51  0.10 - 1.00 x10*3/uL Final    Eosinophils Absolute 04/08/2025 0.28  0.00 - 0.70 x10*3/uL Final    Basophils Absolute 04/08/2025 0.10  0.00 - 0.10 x10*3/uL Final    Glucose 04/08/2025 93  74 - 99 mg/dL Final    Sodium 04/08/2025 139  136 - 145 mmol/L Final    Potassium 04/08/2025 4.0  3.5 - 5.3 mmol/L Final    Chloride 04/08/2025 104  98 - 107 mmol/L Final    Bicarbonate 04/08/2025 25  21 - 32 mmol/L Final    Anion Gap 04/08/2025 14  10 - 20 mmol/L Final    Urea Nitrogen 04/08/2025 14  6 - 23 mg/dL Final    Creatinine 04/08/2025 0.79  0.50 - 1.30 mg/dL Final    eGFR 04/08/2025 >90  >60 mL/min/1.73m*2 Final    Calcium 04/08/2025 9.9  8.6 - 10.3 mg/dL Final    Albumin 04/08/2025 4.8  3.4 - 5.0 g/dL Final    Alkaline Phosphatase 04/08/2025 51  33 - 120 U/L Final    Total Protein 04/08/2025 7.2  6.4 - 8.2 g/dL Final    AST 04/08/2025 19  9 - 39 U/L Final    Bilirubin, Total 04/08/2025 0.6  0.0 - 1.2 mg/dL Final    ALT 04/08/2025 12  10 - 52 U/L Final    LDH 04/08/2025 150  84 - 246 U/L Final    Thyroid Stimulating Hormone 04/08/2025 2.42  0.44 - 3.98 mIU/L Final         Radiology Result:  I have reviewed the latest Imaging in PACS and the  "findings are noted in this note. I discussed the results of the latest imaging with the patient. All previous imaging were reviewed at the time it was completed. Full records are available in the EMR for review as well.     CT chest abdomen pelvis w IV contrast  Result Date: 4/14/2025  Impression: *Melanoma restaging scan compared to 01/09/2025. No evidence of metastatic disease in the chest abdomen or pelvis. *Additional stable chronic and incidental findings described above.     I personally reviewed the images/study and I agree with the findings as stated by Resident Hieu Soto.   MACRO: None.   Signed by: Briana Lawrence 4/14/2025 6:00 PM Dictation workstation:   [id number]    CT soft tissue neck w IV contrast  Result Date: 4/8/2025  Impression: 1. Stable examination. Stable postsurgical changes from right parotidectomy without evidence of residual/recurrent disease. 2. No pathologic lymphadenopathy by imaging criteria.   MACRO: None   Signed by: Marycruz Gomez 4/8/2025 2:44 PM Dictation workstation:   [id number]        Pathology Results:  I have reviewed the full pathology report recorded in the EMR. The pertinent portions indicating diagnosis are listed here in the note. for details please refer to the full report recorded in the EMR.    Surgical Pathology [Mohan 10 2022 2:15PM] ([id number])     Specimens: RIGHT SUPERFICIAL PAROTIDECTOMY OVER LINE FACIAL SKIN. LONG STITCH SUPERIOR     Name MATTIE MCCULLOUGH     Accession #: [id number]   Pathologist: TYSON LARKIN MD, Board Certified   Dermatopathologist   Date of Procedure:  5/23/2022   Date Received: 5/23/2022   Date Reported 6/10/2022   Submitting Physician: JORGE POWELL MD   Location: [facility] Other External #       FINAL DIAGNOSIS   A. SPECIMEN DESIGNATED \"RIGHT SUPERFICIAL PAROTIDECTOMY OVER  LINE FACIAL   SKIN\":   --METASTATIC MELANOMA INVOLVING AT LEAST ONE OF SIXTEEN INTRAPAROTID LYMPH NODES WITH EXTRACAPSULAR EXTENSION (1/16), SEE COMMENT   --SKIN WITH " AN ATYPICAL JUNCTIONAL MELANOCYTIC PROLIFERATION, LOCALIZED AREA OF DERMAL  MELANOSIS AND BIOPSY SITE CHANGES, SEE COMMENT     COMMENT: Sections show actinically altered skin with increased single mild to moderately enlarged junctional melanocytes with poor circumscription, focal areas of confluence and involvement of  superficial follicular epithelium, present at peripheral margins. There is a localized collection of dermal and subcutaneous melanophages approximately 6.5 mm in depth, involving the superior margin. There is a predominantly necrotic intraparotid mass  (2.3 cm) with focal collections of severely atypical melanocytes and extensive melanosis, present at the superficial margin and extending < 1 mm from the deep and anterior margins. Melanocytes are highlighted with SOX-10 and Melan-A. Intraparotid melanocytes  and isolated junctional melanocytes are PRAME positive. (Controlsstain adequately.)     The skin findings are concerning for regressed invasive melanoma, lentigo maligna type combined with actinic melanocytosis of sun-damaged skin and reactive  melanocytic hyperplasia from the trauma of the previous procedure. The intraparotid mass may represent multiple matted positive lymph nodes, the exact number of which cannot be determined and is therefore counted as one positive lymph node with extracapsular  extension. Results of BRAF testing will be reported separately.     If clinically appropriate, further reexcision of the surgical site is recommended.     : Dr. Jason Matthew     The gross and/or microscopic findings  were reviewed in conjunction with pathology resident, Kassie Panda MD.     Electronically Signed Out By TYSON LARKIN MD, Board Certified Dermatopathologist/SWATI   By the signature on this report, the individual or group listed as making  the Final Interpretation/Diagnosis certifies that they have reviewed this case. Diagnostic interpretation performed at [facility]  [place] [address] 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 Please see the evolution of the case listed above in the cancer history.     Today,   We reviewed the scans together in the clinic. I reviewed the report with the patient. I am concerned about the diagnosis of IO-related arthritis. . Errol Das has a very high risk melanoma which is high risk of relapse. He is on immunosuppression with plaquenil which increases risk of relapse as well as other skin cancers. I discussed this with the patient. This is the reason he has been getting more frequent CT scans, but I would like to relax this after 3 years from the last surgery. I will go on to 6-month scans now. He will discuss this situation and plaquenil with his rheumatologist as well.     # Stage III melanoma s/p resection and adjuvant therapy.   - CT scan neck, chest, abdomen, and pelvis with contrast in 6 months with labs.   - Visit after the scans and labs.     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 I also recommend that patient be taken off immunosuppression, especially if this is not helping him. Local steroid injections are OK.   - Patient will discuss this with his rheumatologist.     # IO-related hypothyroidism  - Continue levothyroxine at 112mcg po daily.   - Patient advised to establish a PCP who will take over managing this drug in the future.      DISCLAIMER:   In preparing for this visit and writing this note, I reviewed all the previous electronic medical records (labs, imaging and medical charts) of the patient available in the physician portal. Significant findings which helped in decision making are recorded  in this chart.     The plan was discussed with the patient. We gave him ample opportunities to ask  questions. All questions were answered to his satisfaction and he verbalized understanding.      Ilir Esteves MD    Division of Hematology and Oncology  [facility] [employer] [facility]    Co-Director Sarcoma and Cutaneous Oncology  [facility]    Phone: [phone]  Fax: [phone]       [1]   Current Outpatient Medications on File Prior to Visit   Medication Sig Dispense Refill    hydroxychloroquine (Plaquenil) 200 mg tablet Take 2 tablets (400 mg) by mouth once daily. 90 tablet 1    levothyroxine (Synthroid, Levoxyl) 112 mcg tablet Take 1 tablet (112 mcg) by mouth once daily in the morning. Take before meals. 30 tablet 11     No current facility-administered medications on file prior to visit.

## 2025-04-22 ENCOUNTER — OFFICE VISIT (OUTPATIENT)
Facility: HOSPITAL | Age: 60
End: 2025-04-22
Payer: COMMERCIAL

## 2025-04-22 VITALS
DIASTOLIC BLOOD PRESSURE: 70 MMHG | TEMPERATURE: 97.2 F | WEIGHT: 166 LBS | BODY MASS INDEX: 25.16 KG/M2 | HEIGHT: 68 IN | OXYGEN SATURATION: 98 % | SYSTOLIC BLOOD PRESSURE: 109 MMHG | HEART RATE: 58 BPM

## 2025-04-22 DIAGNOSIS — Z12.11 ENCOUNTER FOR SCREENING FOR MALIGNANT NEOPLASM OF COLON: Primary | ICD-10-CM

## 2025-04-22 DIAGNOSIS — G47.25 CIRCADIAN RHYTHM SLEEP DISORDER, JET LAG TYPE: ICD-10-CM

## 2025-04-22 PROCEDURE — 99396 PREV VISIT EST AGE 40-64: CPT | Mod: GC

## 2025-04-22 RX ORDER — ZOLPIDEM TARTRATE 10 MG/1
10 TABLET ORAL NIGHTLY PRN
Qty: 10 TABLET | Refills: 0 | Status: SHIPPED | OUTPATIENT
Start: 2025-04-22 | End: 2025-06-21

## 2025-04-22 ASSESSMENT — PATIENT HEALTH QUESTIONNAIRE - PHQ9
SUM OF ALL RESPONSES TO PHQ9 QUESTIONS 1 AND 2: 0
2. FEELING DOWN, DEPRESSED OR HOPELESS: NOT AT ALL
1. LITTLE INTEREST OR PLEASURE IN DOING THINGS: NOT AT ALL

## 2025-04-22 ASSESSMENT — COLUMBIA-SUICIDE SEVERITY RATING SCALE - C-SSRS
2. HAVE YOU ACTUALLY HAD ANY THOUGHTS OF KILLING YOURSELF?: NO
1. IN THE PAST MONTH, HAVE YOU WISHED YOU WERE DEAD OR WISHED YOU COULD GO TO SLEEP AND NOT WAKE UP?: NO
6. HAVE YOU EVER DONE ANYTHING, STARTED TO DO ANYTHING, OR PREPARED TO DO ANYTHING TO END YOUR LIFE?: NO

## 2025-04-22 ASSESSMENT — ENCOUNTER SYMPTOMS
LOSS OF SENSATION IN FEET: 0
DEPRESSION: 0
OCCASIONAL FEELINGS OF UNSTEADINESS: 0

## 2025-04-22 ASSESSMENT — PAIN SCALES - GENERAL: PAINLEVEL_OUTOF10: 0-NO PAIN

## 2025-04-22 NOTE — PROGRESS NOTES
"Subjective   Patient ID: Errol Das is a 60 y.o. male who presents for Annual Exam.    # Health Maintenance  - Last prior HM: can't remember  - Dental Care: last prior dental visit 18 years ago // brushes teeth twice daily // flosses teeth once daily // denies current tooth pain  - Vision: last prior ophtho visit  in February due to hydroxychloroquinone // corrective devices: reading glasses // recent vision: no changes  - Hearing: denies recent hearing loss   - Exercise: runs long distances (~10-50 km five times daily)  - Weight: stable  - Smoking: never a smoker  Tobacco Use: Low Risk  (4/18/2025)    Patient History     Smoking Tobacco Use: Never     Smokeless Tobacco Use: Never     Passive Exposure: Never   - EtOH: Alcohol Use: Frequency: 6 drinks on a weekend.  - Illicit substances: denied.  - Employment: genetics professor at [employer]  - Living Situation: lives with wife in a house, no issues with housing or food security  - Colon CA: last prior screening Colonoscopy in 2019 - 8 mm sessile polyp with tubular adenoma // family h/o colon ca No    FamHx:  Family History[1]    PMH:  Problem List[2]  Medical History[3]    SurgHx:  Surgical History[4]    Meds:  Medications ordered prior to the current encounter[5]     Patient care team:  Patient Care Team:  Ilir Esteves MD as Consulting Physician (Hematology and Oncology)     Objective     Vitals: /70 (BP Location: Right arm, Patient Position: Sitting, BP Cuff Size: Adult)   Pulse 58   Temp 36.2 °C (97.2 °F) (Temporal)   Ht 1.727 m (5' 8\")   Wt 75.3 kg (166 lb)   SpO2 98%   BMI 25.24 kg/m²      Physical Exam  General:  Well developed. Alert. No acute distress.  Skin:  Warm, dry. normal skin turgor. no rashes. no lesions.   Head: NCAT  EENT: MMM  Cardiovascular:  Regular rate and rhythm, normal S1 and S2, no gallops, no murmurs and no pericardial rub.  Pulmonary:  CTAB in all fields  Abdomen:  (+) BS. soft. non-tender. non-distended. no abdominal " "masses. no guarding or rigidity.  Neurologic:  grossly intact  Musculoskeletal: moves all extremities spontaneously  Psychiatric:  appropriate mood and affect    PHQ2:  Over the past 2 weeks, how often have you been bothered by any of the following problems?  Little interest or pleasure in doing things: Not at all  Feeling down, depressed, or hopeless: Not at all  Patient Health Questionnaire-2 Score: 0    Food insecurity: negative      Assessment/Plan     Routine Health Maintenance  - Flu vaccine: recommended annually  - COVID vaccine: recommended completion of primary series and recommended boosters  - Lipid Panel:   Lab Results   Component Value Date    CHOL 218 (H) 05/08/2023     Lab Results   Component Value Date    HDL 65.8 05/08/2023     No results found for: \"LDLCALC\"  Lab Results   Component Value Date    TRIG 113 05/08/2023   - DM screening: BMI 25.24, however based on physical activity level, no benefit to testing  - HTN screening: wnl today  - Food Insecurity screen: negative  - Depression: PHQ-2 negative  - Last Dental: recommended follow up every 6mo   - Last Eye exam: recommended follow up annually   - Colonoscopy: ordered (high risk given previous path in 2019)  - PSA screening (55-69M): discussed importance of continued surveillance rather than single test, discussed r/b/a, with shared decision making, did not order    Encounter for screening for malignant neoplasm of colon  -     Colonoscopy Screening; High Risk Patient; Future    Circadian rhythm sleep disorder, jet lag type  -     zolpidem (Ambien) 10 mg tablet; Take 1 tablet (10 mg) by mouth as needed at bedtime for sleep.    Attending Supervision: seen and discussed with attending physician Dr. Amy Gonzalez.  Shivam Lindsey MD  Family Medicine Resident.         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)    Hypothyroid    Chronic pain of right knee    History of immunotherapy    Seronegative arthritis    Personal history of skin cancer    Hyperopia with presbyopia of both eyes    Long-term use of hydroxychloroquine   [3]   Past Medical History:  Diagnosis Date    Insomnia     Malignant melanoma of skin of neck (Multi)     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 90 tablet 1    levothyroxine (Synthroid, Levoxyl) 112 mcg tablet Take 1 tablet (112 mcg) by mouth once daily in the morning. Take before meals. 30 tablet 11    zolpidem (Ambien) 10 mg tablet Take 1 tablet (10 mg) by mouth as needed at bedtime for sleep. 10 tablet 0     No current facility-administered medications for this visit.

## 2025-04-25 NOTE — PROGRESS NOTES
I saw and evaluated the patient. I personally obtained the key and critical portions of the history and physical exam or was physically present for key and critical portions performed by the resident/fellow. I reviewed the resident/fellow's documentation and discussed the patient with the resident/fellow. I agree with the resident/fellow's medical decision making as documented in the note with the exception/addition of the following:  The running distance mentioned is per week, not per day.  Amy Gonzalez MD

## 2025-05-11 NOTE — PROGRESS NOTES
Recall  Mr. 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 He was referred by his PCP after the patient told them about a lump in the parotid gland region since beginning of 2022. the lump had grown significantly. He underwent FNAC on 4/28/22 and the path read  melanoma. Then he was taken to OR on 5/23/22 and underwent resection. Pathology report reads 1/16 positive nodes. The lone node has 6.5mm deposit with MELANIE. The parotid mass is necrotic and measures 2.3cm. Negative for BRAF V600.     PET scan (on 7/11/22) and MRI brain (7/14/22) did not show any activity concerning for tumors. We recommend adjuvant pembrolizumab. Patient consented on 7/19/22 and started Keytruda 400mg every 6 weeks on 7/26/22. He completed one year of therapy on 08/01/2023 and is currently on surveillance.      Treatment History:    Systemic treatment  1. Pembrolizumab 400mg every 6 weeks- s/p 9 cycles as of 08/01/2023  Prior  irAE's  1. Hypothyroidism (currently on Synthroid 112mcg PO daily).      PhD cancer genetics researcher by profession  Patient is an avid runner, runs ultra marathons  Has noticed bilateral knee pain from January onwards, mainly in the right knee  Was not related to activity, would feel pain at rest, along with stiffness and walking around would help  Stiffness generally less than 30 mins; he gradually noticed the swelling in the knee along with pain that was getting worse  Visited  in March, had right knee aspiration performed  That was consitent with inflammatory arthritis with negative cultures and negative crystal exam  Had knee xray performed that showed moderate effusion otherwise preserved Joint space  Labs otherwise showed normal UA levels, negative RF and ACPA.  Referred to rheumatology for further diagnostic work up and management    Mentions he had moderate  improvement after drainage, but symptoms have persisted and now gradually getting worse  He is not taking any OTC analgesia  Has recently started running again and gradually increasing his average.    Other joints are fine,   No swelling of the knuckles, difficulty making a fist  No rash, sicca symptoms, photosensitivity, dyspnea , abdominal pains  No RP , oral or genital ulcers    On levothyroxine for ICI induced hypothyroidism, otherwise not on any other long term meds  Completed ICI August/september 2023    Subjective   Patient ID: [id number]   Errol Das is a 60 y.o. male who presents for follow-up of inflammatory arthritis.    Chief Complaint: inflammatory arthritis    Current treatment:  HCQ monotherapy 400 mg Monday-Friday and 200 mg Sat/Sun (4/2024- ), dose reduced 11/2024 visit given weight loss    Previous treatment:  Prednisone     Interval History   Last seen 11/2024  L knee fluid analysis - CPPD crystals     Knees feel stable, felt significant relief after last aspiration/injection- lasted for at least 3 months but now bilateral knee effusions building up again  No red joints  Did race 1 month ago  Unsure if he feels a difference on/off the HCQ  Feels that aspiration + CSI injetion helps wth most  Has not used prednisone    Still having some stiffness in knees, hips in AM, but reports this predates inflammatory arthritis symptoms  Other joints are fine  No rash  No s/e from HCQ    Per onc note 10/2024, 9/2024 surveillance CT stable and no evidence of melanoma  Last derm 12/2024 no evidence of malignancy, RTC 6 mo (6/2025)    4/2025 CT chest/A/P w/c  No evidence of metastatic disease in the chest abdomen or pelvis     4/2025 CT soft tissue neck w/c  Stable examination. Stable postsurgical changes from right parotidectomy without evidence of residual/recurrent disease. 2. 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)    Hypothyroid    Chronic pain of right knee    History of immunotherapy    Seronegative arthritis    Personal history of skin cancer    Hyperopia with presbyopia of both eyes    Long-term use of hydroxychloroquine        Past Medical History:   Diagnosis Date    Insomnia     Malignant melanoma of skin of neck (Multi)     Melanoma metastatic to lymph node (Multi)     Seronegative arthritis         Past Surgical History:   Procedure Laterality Date    LYMPH NODE BIOPSY  2022    OTHER SURGICAL HISTORY  10/28/2013    Corneal LASIK Bilateral    OTHER SURGICAL HISTORY  10/28/2013    Finger Tendon Lengthening    TONSILLECTOMY  10/28/2013    Tonsillectomy        Social History     Socioeconomic History    Marital status:      Spouse name: Not on file    Number of children: Not on file    Years of education: Not on file    Highest education level: Not on file   Occupational History    Not on file   Tobacco Use    Smoking status: Never     Passive exposure: Never    Smokeless tobacco: Never   Substance and Sexual Activity    Alcohol use: Yes     Alcohol/week: 7.0 standard drinks of alcohol     Types: 7 Cans of beer per week     Comment: in a week    Drug use: Never    Sexual activity: Yes     Partners: Female     Birth control/protection: None   Other Topics Concern    Not on file   Social History Narrative    Not on file     Social Drivers of Health     Financial Resource Strain: Not on file   Food Insecurity: Not on file   Transportation Needs: Not on file   Physical Activity: Not on file   Stress: Not on file   Social Connections: Not on file   Intimate Partner Violence: Not on file   Housing Stability: Not on  file        No Known Allergies       Current Outpatient Medications:     levothyroxine (Synthroid, Levoxyl) 112 mcg tablet, Take 1 tablet (112 mcg) by mouth once daily in the morning. Take before meals., Disp: 30 tablet, Rfl: 11    zolpidem (Ambien) 10 mg tablet, Take 1 tablet (10 mg) by mouth as needed at bedtime for sleep., Disp: 10 tablet, Rfl: 0    folic acid (Folvite) 1 mg tablet, Take 1 tablet (1 mg) by mouth once daily., Disp: 90 tablet, Rfl: 1    methotrexate (Trexall) 2.5 mg tablet, Take 6 tablets (15 mg total) by mouth 1 (one) time per week.  Follow directions carefully, and ask to explain any part you do not understand. Take exactly as directed., Disp: 72 tablet, Rfl: 1       Objective     Visit Vitals  /66 (BP Location: Right arm, Patient Position: Sitting, BP Cuff Size: Adult)   Pulse 52   Temp 36.4 °C (97.5 °F) (Oral)           Physical Exam  General: AAOx3, Cooperative  Head: normocephalic, atraumatic  Eyes: EOMI, conjunctiva clear, sclera white, anicteric  Ears: no redness, swelling, tenderness  Nose: no deformity, no crusting   Throat/Mouth: No oral deformities, no cheek swelling, mucosa appear moist, no oral ulcers noted or loss of dentition   Neck/Lymph: FROM, trachea midline  Lungs: chest expansion symmetric. No respiratory distress.   Heart: RRR  Neuro: CN II-XII grossly intact, no focal deficit  Skin: No rashes, ulcers or photosensitive areas  MSK: Upper Extremities:  Hand/Fingers: No erythema, swelling, tenderness or warmth at DIP, PIP, or MCP joints, FROM grossly. Good hand . No nodules. No deformities   Wrists: No erythema, swelling, warmth or tenderness at wrist, FROM grossly  Elbows: No tenderness, swelling, erythema or warmth at elbows, FROM grossly. No nodules   Shoulders: No swelling, erythema, tenderness or warmth at shoulders. FROM  Lower Extremities:   Hips: No obvious deformities. No joint tenderness, normal ROM grossly. Log roll test negative bilaterally. Selena test is  "negative bilaterally. No trochanteric bursae TTP  Knees: previously 4/2024  bilateral warm knees with moderate effusion with synovial hypertrophy in the right knee  With minimal tenderness over the medial suprapatellar aspect  -Bedside ultrasound shows moderate effusion , no evidence of enthesitis at the quadriceps and patellar tendon insertion sites  -Lateral and medial meniscus wo any defects on ultrasound    6/24   Normal knee exam    11/2024  Right knee with large suprapatellar effusion, left knee mild-moderate effusion. No erythema, warmth, or tenderness of either knee    5/2025  Bilateral knees with large suprapatellar effusions, R>L. No erythema, warmth, or tenderness of either knee. Effusion also demonstrated on POCUS     Lab Results   Component Value Date    WBC 6.2 04/08/2025    HGB 13.6 04/08/2025    HCT 41.3 04/08/2025    MCV 94 04/08/2025     04/08/2025        Chemistry    Lab Results   Component Value Date/Time     04/08/2025 1205    K 4.0 04/08/2025 1205     04/08/2025 1205    CO2 25 04/08/2025 1205    BUN 14 04/08/2025 1205    CREATININE 0.79 04/08/2025 1205    Lab Results   Component Value Date/Time    CALCIUM 9.9 04/08/2025 1205    ALKPHOS 51 04/08/2025 1205    AST 19 04/08/2025 1205    ALT 12 04/08/2025 1205    BILITOT 0.6 04/08/2025 1205           Lab Results   Component Value Date    CRP 0.25 04/29/2024      Lab Results   Component Value Date    RF <10 03/14/2024    SEDRATE 27 (H) 03/14/2024      No results found for: \"CKTOTAL\"  Lab Results   Component Value Date    NEUTROABS 3.39 04/08/2025      Lab Results   Component Value Date    FERRITIN 22 11/20/2024      Lab Results   Component Value Date    HEPBCIGM NONREACTIVE 07/19/2022    HEPCAB NONREACTIVE 07/19/2022      Lab Results   Component Value Date    ALT 12 04/08/2025    AST 19 04/08/2025    ALKPHOS 51 04/08/2025    BILITOT 0.6 04/08/2025      No results found for: \"PPD\"   Lab Results   Component Value Date    URICACID 4.2 " "03/14/2024      Lab Results   Component Value Date    PTH 33.0 11/20/2024    CALCIUM 9.9 04/08/2025      No results found for: \"SPEP\", \"UPEP\"   No results found for: \"ALBUR\", \"[id number]\"   .last          CT chest abdomen pelvis w IV contrast  Narrative: Interpreted By:  Briana Lawrence,  and Charles Hagen   STUDY:  CT CHEST ABDOMEN PELVIS W IV CONTRAST;  4/8/2025 2:30 pm      INDICATION:  Signs/Symptoms:Melanoma of the parotid metastatic to lymph nodes,  currently on surveillance.      ,C79.89 Secondary malignant neoplasm of other specified sites,C77.9  Secondary and unspecified malignant neoplasm of lymph node,  unspecified      COMPARISON:  CT CHEST ABDOMEN PELVIS W IV CONTRAST 1/9/2025      ACCESSION NUMBER(S):  [id number]      ORDERING CLINICIAN:  TYSON GUADALUPE      TECHNIQUE:  Contiguous axial images of the chest, abdomen, and pelvis were  obtained. Coronal and sagittal reformatted images were reconstructed  from the axial data. 70 ML of Omnipaque 350 was administered  intravenously without immediate complication.      FINDINGS:  CT CHEST:      MEDIASTINUM AND LYMPH NODES: The thyroid gland is unremarkable.  The  esophageal wall appears within normal limits.  No enlarged  intrathoracic or axillary lymph nodes by imaging criteria. No  pneumomediastinum.      VESSELS:  Normal caliber thoracic aorta . No significant aortic  atherosclerosis.      HEART: Normal size.  No coronary artery calcifications. No  significant pericardial effusion.      LUNG, AIRWAYS, PLEURA: The trachea and mainstem bronchi are patent.  No endobronchial lesion. No consolidation, pulmonary edema, pleural  effusion or pneumothorax. No suspicious pulmonary nodules.      CHEST WALL SOFT TISSUES: No discernible acute abnormality.      OSSEOUS STRUCTURES: No acute osseous abnormality.          CT ABDOMEN/PELVIS:      ABDOMINAL WALL: No significant abnormality.      LIVER: The liver is normal in size and enhancement. Stable tiny  hypodense " lesion in segment 2 and 5, too small to completely  characterize, likely benign. No new suspicious liver lesions.      BILE DUCTS: No significant intrahepatic or extrahepatic dilatation.      GALLBLADDER: No significant abnormality.      PANCREAS: No significant abnormality.      SPLEEN: No significant abnormality.      ADRENALS: No significant abnormality.      KIDNEYS, URETERS, BLADDER:  No significant abnormality.      REPRODUCTIVE ORGANS: Stable prostatomegaly measuring 5.3 cm in  transverse diameter.      VESSELS: Mild atherosclerosis calcification of the abdominal aorta  and its branching vessels without aneurysmal dilatation. The IVC is  normal in caliber      RETROPERITONEUM/LYMPH NODES: No enlarged lymph nodes. No acute  retroperitoneal abnormality.      BOWEL/MESENTERY/PERITONEUM:  No inflammatory bowel wall thickening or  dilatation. Normal appendix.      No ascites, free air, or fluid collection.          MUSCULOSKELETAL: No acute osseous abnormality. Stable chronic  bilateral L5 pars defects contributing to grade 1 L5-S1  anterolisthesis. Stable mild discogenic degenerative changes of the  lower lumbar spine. No suspicious osseous lesions.      Impression: *Melanoma restaging scan compared to 01/09/2025. No evidence of  metastatic disease in the chest abdomen or pelvis. *Additional stable  chronic and incidental findings described above.          I personally reviewed the images/study and I agree with the findings  as stated by Resident Hieu Soto.      MACRO:  None.      Signed by: Briana Lawrence 4/14/2025 6:00 PM  Dictation workstation:   [id number]     === 01/23/24 ===    XR KNEE 1-2 VIEWS RIGHT    - Impression -  Nonspecific right knee joint effusion. No acute osseous abnormality.    Signed by: Luke Johns 1/23/2024 3:44 PM  Dictation workstation:   [id number]   === 07/14/22 ===    MR BRAIN W AND WO CONTRAST    - Impression -  1. No evidence of intracranial metastatic disease.  2. Moderate  paranasal sinus mucosal thickening with bilateral  maxillary sinus air-fluid levels, correlate with concern for  sinusitis.    I personally reviewed the images/study and I agree with the findings  as stated. This study was interpreted at [facility], [place], [place].       Large Joint Injection/Arthrocentesis: bilateral supra patellar bursa on 5/12/2025 2:15 PM  Indications: joint swelling and diagnostic evaluation  Details: 20 G needle, ultrasound-guided  Medications (Right): 40 mg triamcinolone acetonide 40 mg/mL; 1 mL lidocaine 10 mg/mL (1 %)  Aspirate (Right): serous, yellow and blood-tinged  Medications (Left): 40 mg triamcinolone acetonide 40 mg/mL; 1 mL lidocaine 10 mg/mL (1 %)  Aspirate (Left): serous and yellow; sent for lab analysis  Outcome: tolerated well, no immediate complications  Procedure, treatment alternatives, risks and benefits explained, specific risks discussed. Consent was given by the patient. Immediately prior to procedure a time out was called to verify the correct patient, procedure, equipment, support staff and site/side marked as required. Patient was prepped and draped in the usual sterile fashion.          Assessment/Plan   60 y/o gentleman with PMH of metastatic parotid melanoma s/p pembrolizumab, hypothyroidism, who presnets for follow-up of ICI-induced inflammatory arthritis.    ICI induced inflammatory arthritis - Impression is Grade 2 irAE  History of immunotherapy (Pembrolizumab PDL1i), total 1 year of therapy, completed 8/2023  Affecting bilateral knees (R>L)  Seronegative status, no s/s of PsA at this time  At 4/2024 visit, Gout was ruled out, has no real risk factors and imaging did not show chondrocalcinosis and US MaxFLEX view did not show evidence of such as well ( also he is 59 wo any other know metabolic risk factors for CPPD)  11/2024 R knee aspirate showed CPPD crystals, with negative secondary CPPD work-up negative  Hep B/C negative  status  Quantiferon not needed at this time    Bilateral suprapatellar joint aspiration performed today under ultrasound, with over 30 ml of serous yellow fluid obtained bilaterally. L knee fluid sent for analysis  40 mg of Kenalog also injected bilaterally, patient tolerated procedure well  Given patient continues to have recurrence of inflammatory arthritis flares while on HCQ monotherapy, will discontinue at this time and start methotrexate (15 mg weekly) with daily folic acid  Noted heme/onc concern regarding immunosuppression and increased risk of melanoma relapse/occurrence of other skin cancers- however, benefits outweigh risks at this time to use immunosuppression, as untreated inflammatory arthritis can contribute to joint damage. Repeated knee aspirations with steroid injections unfortunately canot be used as long term treatment for inflammatory arthritis    HM  Patient was counseled to update pneumococcal vaccine, already has received Shingles vaccine    RTC 3 months and sooner PRN    Patient seen and discussed with Dr. Babcock.    Christiana Cobb MD  Rheumatology Fellow, PGY-4    Orders Placed This Encounter   Procedures    Large Joint Injection/Arthrocentesis    Sterile Fluid Culture/Smear    Point of Care Ultrasound    Body Fluid Cell Count With Differential    Crystal Identification and Pathologist Review Synovial Fluid        Quality 226: Preventive Care And Screening: Tobacco Use: Screening And Cessation Intervention: Patient screened for tobacco use and is an ex/non-smoker Detail Level: Detailed Quality 130: Documentation Of Current Medications In The Medical Record: Current Medications Documented

## 2025-05-12 ENCOUNTER — LAB REQUISITION (OUTPATIENT)
Dept: LAB | Facility: HOSPITAL | Age: 60
End: 2025-05-12
Payer: COMMERCIAL

## 2025-05-12 ENCOUNTER — APPOINTMENT (OUTPATIENT)
Dept: RHEUMATOLOGY | Facility: CLINIC | Age: 60
End: 2025-05-12
Payer: COMMERCIAL

## 2025-05-12 VITALS
WEIGHT: 168 LBS | BODY MASS INDEX: 25.46 KG/M2 | HEART RATE: 52 BPM | SYSTOLIC BLOOD PRESSURE: 102 MMHG | DIASTOLIC BLOOD PRESSURE: 66 MMHG | HEIGHT: 68 IN | TEMPERATURE: 97.5 F

## 2025-05-12 DIAGNOSIS — G89.29 CHRONIC PAIN OF RIGHT KNEE: ICD-10-CM

## 2025-05-12 DIAGNOSIS — M13.80 SERONEGATIVE ARTHRITIS: ICD-10-CM

## 2025-05-12 DIAGNOSIS — Z79.899 LONG-TERM USE OF HYDROXYCHLOROQUINE: ICD-10-CM

## 2025-05-12 DIAGNOSIS — Z92.89 PERSONAL HISTORY OF OTHER MEDICAL TREATMENT: ICD-10-CM

## 2025-05-12 DIAGNOSIS — M19.90 INFLAMMATORY ARTHRITIS: ICD-10-CM

## 2025-05-12 DIAGNOSIS — M25.561 PAIN IN RIGHT KNEE: ICD-10-CM

## 2025-05-12 DIAGNOSIS — Z79.899 OTHER LONG TERM (CURRENT) DRUG THERAPY: ICD-10-CM

## 2025-05-12 DIAGNOSIS — Z92.89 HISTORY OF IMMUNOTHERAPY: ICD-10-CM

## 2025-05-12 DIAGNOSIS — M25.561 CHRONIC PAIN OF RIGHT KNEE: ICD-10-CM

## 2025-05-12 DIAGNOSIS — M13.80 OTHER SPECIFIED ARTHRITIS, UNSPECIFIED SITE: ICD-10-CM

## 2025-05-12 DIAGNOSIS — G89.29 OTHER CHRONIC PAIN: ICD-10-CM

## 2025-05-12 DIAGNOSIS — M19.90 UNSPECIFIED OSTEOARTHRITIS, UNSPECIFIED SITE: ICD-10-CM

## 2025-05-12 PROCEDURE — 87075 CULTR BACTERIA EXCEPT BLOOD: CPT

## 2025-05-12 PROCEDURE — 87205 SMEAR GRAM STAIN: CPT

## 2025-05-12 PROCEDURE — 87070 CULTURE OTHR SPECIMN AEROBIC: CPT

## 2025-05-12 PROCEDURE — 87070 CULTURE OTHR SPECIMN AEROBIC: CPT | Mod: OUT | Performed by: STUDENT IN AN ORGANIZED HEALTH CARE EDUCATION/TRAINING PROGRAM

## 2025-05-12 RX ORDER — METHOTREXATE 2.5 MG/1
15 TABLET ORAL WEEKLY
Qty: 72 TABLET | Refills: 1 | Status: SHIPPED | OUTPATIENT
Start: 2025-05-12 | End: 2025-08-10

## 2025-05-12 RX ORDER — FOLIC ACID 1 MG/1
1 TABLET ORAL DAILY
Qty: 90 TABLET | Refills: 1 | Status: SHIPPED | OUTPATIENT
Start: 2025-05-12 | End: 2025-11-08

## 2025-05-12 RX ORDER — LIDOCAINE HYDROCHLORIDE 10 MG/ML
1 INJECTION, SOLUTION INFILTRATION; PERINEURAL
Status: COMPLETED | OUTPATIENT
Start: 2025-05-12 | End: 2025-05-12

## 2025-05-12 RX ORDER — TRIAMCINOLONE ACETONIDE 40 MG/ML
40 INJECTION, SUSPENSION INTRA-ARTICULAR; INTRAMUSCULAR
Status: COMPLETED | OUTPATIENT
Start: 2025-05-12 | End: 2025-05-12

## 2025-05-12 RX ADMIN — TRIAMCINOLONE ACETONIDE 40 MG: 40 INJECTION, SUSPENSION INTRA-ARTICULAR; INTRAMUSCULAR at 14:15

## 2025-05-12 RX ADMIN — LIDOCAINE HYDROCHLORIDE 1 ML: 10 INJECTION, SOLUTION INFILTRATION; PERINEURAL at 14:15

## 2025-05-12 ASSESSMENT — PATIENT HEALTH QUESTIONNAIRE - PHQ9
2. FEELING DOWN, DEPRESSED OR HOPELESS: NOT AT ALL
SUM OF ALL RESPONSES TO PHQ9 QUESTIONS 1 AND 2: 0
1. LITTLE INTEREST OR PLEASURE IN DOING THINGS: NOT AT ALL

## 2025-05-12 NOTE — LETTER
May 12, 2025     Ilir Esteves MD  [id number]  [facility] [address]    Patient: Errol Das   YOB: 1965   Date of Visit: 5/12/2025       Dear Dr. Ilir Esteves MD:    Thank you for referring Errol Das to me for evaluation. Below are my notes for this consultation.  If you have questions, please do not hesitate to call me. I look forward to following your patient along with you.       Sincerely,     Christiana Cobb MD      CC: No Recipients  ______________________________________________________________________________________    Recall  Mr. 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 He was referred by his PCP after the patient told them about a lump in the parotid gland region since beginning of 2022. the lump had grown significantly. He underwent FNAC on 4/28/22 and the path read  melanoma. Then he was taken to OR on 5/23/22 and underwent resection. Pathology report reads 1/16 positive nodes. The lone node has 6.5mm deposit with MELANIE. The parotid mass is necrotic and measures 2.3cm. Negative for BRAF V600.     PET scan (on 7/11/22) and MRI brain (7/14/22) did not show any activity concerning for tumors. We recommend adjuvant pembrolizumab. Patient consented on 7/19/22 and started Keytruda 400mg every 6 weeks on 7/26/22. He completed one year of therapy on 08/01/2023 and is currently on surveillance.      Treatment History:    Systemic treatment  1. Pembrolizumab 400mg every 6 weeks- s/p 9 cycles as of 08/01/2023  Prior  irAE's  1. Hypothyroidism (currently on Synthroid 112mcg PO daily).      PhD cancer genetics researcher by profession  Patient is an avid runner, runs ultra marathons  Has noticed bilateral knee pain from January onwards, mainly in the right knee  Was not related to activity, would feel pain at rest, along with  stiffness and walking around would help  Stiffness generally less than 30 mins; he gradually noticed the swelling in the knee along with pain that was getting worse  Visited  in March, had right knee aspiration performed  That was consitent with inflammatory arthritis with negative cultures and negative crystal exam  Had knee xray performed that showed moderate effusion otherwise preserved Joint space  Labs otherwise showed normal UA levels, negative RF and ACPA.  Referred to rheumatology for further diagnostic work up and management    Mentions he had moderate improvement after drainage, but symptoms have persisted and now gradually getting worse  He is not taking any OTC analgesia  Has recently started running again and gradually increasing his average.    Other joints are fine,   No swelling of the knuckles, difficulty making a fist  No rash, sicca symptoms, photosensitivity, dyspnea , abdominal pains  No RP , oral or genital ulcers    On levothyroxine for ICI induced hypothyroidism, otherwise not on any other long term meds  Completed ICI August/september 2023    Subjective  Patient ID: [id number]   Errol Das is a 60 y.o. male who presents for follow-up of inflammatory arthritis.    Chief Complaint: inflammatory arthritis    Current treatment:  HCQ monotherapy 400 mg Monday-Friday and 200 mg Sat/Sun (4/2024- ), dose reduced 11/2024 visit given weight loss    Previous treatment:  Prednisone     Interval History   Last seen 11/2024  L knee fluid analysis - CPPD crystals     Knees feel stable, felt significant relief after last aspiration/injection- lasted for at least 3 months but now bilateral knee effusions building up again  No red joints  Did race 1 month ago  Unsure if he feels a difference on/off the HCQ  Feels that aspiration + CSI injetion helps wth most  Has not used prednisone    Still having some stiffness in knees, hips in AM, but reports this predates inflammatory arthritis symptoms  Other  joints are fine  No rash  No s/e from HCQ    Per onc note 10/2024, 9/2024 surveillance CT stable and no evidence of melanoma  Last derm 12/2024 no evidence of malignancy, RTC 6 mo (6/2025)    4/2025 CT chest/A/P w/c  No evidence of metastatic disease in the chest abdomen or pelvis     4/2025 CT soft tissue neck w/c  Stable examination. Stable postsurgical changes from right parotidectomy without evidence of residual/recurrent disease. 2. 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)   • Hypothyroid   • Chronic pain of right knee   • History of immunotherapy   • Seronegative arthritis   • Personal history of skin cancer   • Hyperopia with presbyopia of both eyes   • Long-term use of hydroxychloroquine        Past Medical History:   Diagnosis Date   • Insomnia    • Malignant melanoma of skin of neck (Multi)    • Melanoma metastatic to lymph node (Multi)    • Seronegative arthritis         Past Surgical History:   Procedure Laterality Date   • LYMPH NODE BIOPSY  2022   • OTHER SURGICAL HISTORY  10/28/2013    Corneal LASIK Bilateral   • OTHER SURGICAL HISTORY  10/28/2013    Finger Tendon Lengthening   • TONSILLECTOMY  10/28/2013    Tonsillectomy        Social History     Socioeconomic History   • Marital status:      Spouse name: Not on file   • Number of children: Not on file   • Years of education: Not on file   • Highest education level: Not on file   Occupational History   • Not on file   Tobacco Use   • Smoking status: Never     Passive exposure: Never   • Smokeless tobacco: Never   Substance and Sexual Activity   • Alcohol  use: Yes     Alcohol/week: 7.0 standard drinks of alcohol     Types: 7 Cans of beer per week     Comment: in a week   • Drug use: Never   • Sexual activity: Yes     Partners: Female     Birth control/protection: None   Other Topics Concern   • Not on file   Social History Narrative   • Not on file     Social Drivers of Health     Financial Resource Strain: Not on file   Food Insecurity: Not on file   Transportation Needs: Not on file   Physical Activity: Not on file   Stress: Not on file   Social Connections: Not on file   Intimate Partner Violence: Not on file   Housing Stability: Not on file        No Known Allergies       Current Outpatient Medications:   •  levothyroxine (Synthroid, Levoxyl) 112 mcg tablet, Take 1 tablet (112 mcg) by mouth once daily in the morning. Take before meals., Disp: 30 tablet, Rfl: 11  •  zolpidem (Ambien) 10 mg tablet, Take 1 tablet (10 mg) by mouth as needed at bedtime for sleep., Disp: 10 tablet, Rfl: 0  •  folic acid (Folvite) 1 mg tablet, Take 1 tablet (1 mg) by mouth once daily., Disp: 90 tablet, Rfl: 1  •  methotrexate (Trexall) 2.5 mg tablet, Take 6 tablets (15 mg total) by mouth 1 (one) time per week.  Follow directions carefully, and ask to explain any part you do not understand. Take exactly as directed., Disp: 72 tablet, Rfl: 1       Objective    Visit Vitals  /66 (BP Location: Right arm, Patient Position: Sitting, BP Cuff Size: Adult)   Pulse 52   Temp 36.4 °C (97.5 °F) (Oral)           Physical Exam  General: AAOx3, Cooperative  Head: normocephalic, atraumatic  Eyes: EOMI, conjunctiva clear, sclera white, anicteric  Ears: no redness, swelling, tenderness  Nose: no deformity, no crusting   Throat/Mouth: No oral deformities, no cheek swelling, mucosa appear moist, no oral ulcers noted or loss of dentition   Neck/Lymph: FROM, trachea midline  Lungs: chest expansion symmetric. No respiratory distress.   Heart: RRR  Neuro: CN II-XII grossly intact, no focal deficit  Skin:  No rashes, ulcers or photosensitive areas  MSK: Upper Extremities:  Hand/Fingers: No erythema, swelling, tenderness or warmth at DIP, PIP, or MCP joints, FROM grossly. Good hand . No nodules. No deformities   Wrists: No erythema, swelling, warmth or tenderness at wrist, FROM grossly  Elbows: No tenderness, swelling, erythema or warmth at elbows, FROM grossly. No nodules   Shoulders: No swelling, erythema, tenderness or warmth at shoulders. FROM  Lower Extremities:   Hips: No obvious deformities. No joint tenderness, normal ROM grossly. Log roll test negative bilaterally. Selena test is negative bilaterally. No trochanteric bursae TTP  Knees: previously 4/2024  bilateral warm knees with moderate effusion with synovial hypertrophy in the right knee  With minimal tenderness over the medial suprapatellar aspect  -Bedside ultrasound shows moderate effusion , no evidence of enthesitis at the quadriceps and patellar tendon insertion sites  -Lateral and medial meniscus wo any defects on ultrasound    6/24   Normal knee exam    11/2024  Right knee with large suprapatellar effusion, left knee mild-moderate effusion. No erythema, warmth, or tenderness of either knee    5/2025  Bilateral knees with large suprapatellar effusions, R>L. No erythema, warmth, or tenderness of either knee. Effusion also demonstrated on POCUS     Lab Results   Component Value Date    WBC 6.2 04/08/2025    HGB 13.6 04/08/2025    HCT 41.3 04/08/2025    MCV 94 04/08/2025     04/08/2025        Chemistry    Lab Results   Component Value Date/Time     04/08/2025 1205    K 4.0 04/08/2025 1205     04/08/2025 1205    CO2 25 04/08/2025 1205    BUN 14 04/08/2025 1205    CREATININE 0.79 04/08/2025 1205    Lab Results   Component Value Date/Time    CALCIUM 9.9 04/08/2025 1205    ALKPHOS 51 04/08/2025 1205    AST 19 04/08/2025 1205    ALT 12 04/08/2025 1205    BILITOT 0.6 04/08/2025 1205           Lab Results   Component Value Date    CRP 0.25  "04/29/2024      Lab Results   Component Value Date    RF <10 03/14/2024    SEDRATE 27 (H) 03/14/2024      No results found for: \"CKTOTAL\"  Lab Results   Component Value Date    NEUTROABS 3.39 04/08/2025      Lab Results   Component Value Date    FERRITIN 22 11/20/2024      Lab Results   Component Value Date    HEPBCIGM NONREACTIVE 07/19/2022    HEPCAB NONREACTIVE 07/19/2022      Lab Results   Component Value Date    ALT 12 04/08/2025    AST 19 04/08/2025    ALKPHOS 51 04/08/2025    BILITOT 0.6 04/08/2025      No results found for: \"PPD\"   Lab Results   Component Value Date    URICACID 4.2 03/14/2024      Lab Results   Component Value Date    PTH 33.0 11/20/2024    CALCIUM 9.9 04/08/2025      No results found for: \"SPEP\", \"UPEP\"   No results found for: \"ALBUR\", \"[id number]\"   .last          CT chest abdomen pelvis w IV contrast  Narrative: Interpreted By:  Briana Lawrence and Ebai Jerky   STUDY:  CT CHEST ABDOMEN PELVIS W IV CONTRAST;  4/8/2025 2:30 pm      INDICATION:  Signs/Symptoms:Melanoma of the parotid metastatic to lymph nodes,  currently on surveillance.      ,C79.89 Secondary malignant neoplasm of other specified sites,C77.9  Secondary and unspecified malignant neoplasm of lymph node,  unspecified      COMPARISON:  CT CHEST ABDOMEN PELVIS W IV CONTRAST 1/9/2025      ACCESSION NUMBER(S):  [id number]      ORDERING CLINICIAN:  TYSON GUADALUPE      TECHNIQUE:  Contiguous axial images of the chest, abdomen, and pelvis were  obtained. Coronal and sagittal reformatted images were reconstructed  from the axial data. 70 ML of Omnipaque 350 was administered  intravenously without immediate complication.      FINDINGS:  CT CHEST:      MEDIASTINUM AND LYMPH NODES: The thyroid gland is unremarkable.  The  esophageal wall appears within normal limits.  No enlarged  intrathoracic or axillary lymph nodes by imaging criteria. No  pneumomediastinum.      VESSELS:  Normal caliber thoracic aorta . No significant " aortic  atherosclerosis.      HEART: Normal size.  No coronary artery calcifications. No  significant pericardial effusion.      LUNG, AIRWAYS, PLEURA: The trachea and mainstem bronchi are patent.  No endobronchial lesion. No consolidation, pulmonary edema, pleural  effusion or pneumothorax. No suspicious pulmonary nodules.      CHEST WALL SOFT TISSUES: No discernible acute abnormality.      OSSEOUS STRUCTURES: No acute osseous abnormality.          CT ABDOMEN/PELVIS:      ABDOMINAL WALL: No significant abnormality.      LIVER: The liver is normal in size and enhancement. Stable tiny  hypodense lesion in segment 2 and 5, too small to completely  characterize, likely benign. No new suspicious liver lesions.      BILE DUCTS: No significant intrahepatic or extrahepatic dilatation.      GALLBLADDER: No significant abnormality.      PANCREAS: No significant abnormality.      SPLEEN: No significant abnormality.      ADRENALS: No significant abnormality.      KIDNEYS, URETERS, BLADDER:  No significant abnormality.      REPRODUCTIVE ORGANS: Stable prostatomegaly measuring 5.3 cm in  transverse diameter.      VESSELS: Mild atherosclerosis calcification of the abdominal aorta  and its branching vessels without aneurysmal dilatation. The IVC is  normal in caliber      RETROPERITONEUM/LYMPH NODES: No enlarged lymph nodes. No acute  retroperitoneal abnormality.      BOWEL/MESENTERY/PERITONEUM:  No inflammatory bowel wall thickening or  dilatation. Normal appendix.      No ascites, free air, or fluid collection.          MUSCULOSKELETAL: No acute osseous abnormality. Stable chronic  bilateral L5 pars defects contributing to grade 1 L5-S1  anterolisthesis. Stable mild discogenic degenerative changes of the  lower lumbar spine. No suspicious osseous lesions.      Impression: *Melanoma restaging scan compared to 01/09/2025. No evidence of  metastatic disease in the chest abdomen or pelvis. *Additional stable  chronic and incidental  findings described above.          I personally reviewed the images/study and I agree with the findings  as stated by Resident Hieu Soto.      MACRO:  None.      Signed by: Briana Lawrence 4/14/2025 6:00 PM  Dictation workstation:   [id number]     === 01/23/24 ===    XR KNEE 1-2 VIEWS RIGHT    - Impression -  Nonspecific right knee joint effusion. No acute osseous abnormality.    Signed by: Luke Johns 1/23/2024 3:44 PM  Dictation workstation:   [id number]   === 07/14/22 ===    MR BRAIN W AND WO CONTRAST    - Impression -  1. No evidence of intracranial metastatic disease.  2. Moderate paranasal sinus mucosal thickening with bilateral  maxillary sinus air-fluid levels, correlate with concern for  sinusitis.    I personally reviewed the images/study and I agree with the findings  as stated. This study was interpreted at [place], [place].       Large Joint Injection/Arthrocentesis: bilateral supra patellar bursa on 5/12/2025 2:15 PM  Indications: joint swelling and diagnostic evaluation  Details: 20 G needle, ultrasound-guided  Medications (Right): 40 mg triamcinolone acetonide 40 mg/mL; 1 mL lidocaine 10 mg/mL (1 %)  Aspirate (Right): serous, yellow and blood-tinged  Medications (Left): 40 mg triamcinolone acetonide 40 mg/mL; 1 mL lidocaine 10 mg/mL (1 %)  Aspirate (Left): serous and yellow; sent for lab analysis  Outcome: tolerated well, no immediate complications  Procedure, treatment alternatives, risks and benefits explained, specific risks discussed. Consent was given by the patient. Immediately prior to procedure a time out was called to verify the correct patient, procedure, equipment, support staff and site/side marked as required. Patient was prepped and draped in the usual sterile fashion.          Assessment/Plan  60 y/o gentleman with PMH of metastatic parotid melanoma s/p pembrolizumab, hypothyroidism, who presnets for follow-up of ICI-induced inflammatory  arthritis.    ICI induced inflammatory arthritis - Impression is Grade 2 irAE  History of immunotherapy (Pembrolizumab PDL1i), total 1 year of therapy, completed 8/2023  Affecting bilateral knees (R>L)  Seronegative status, no s/s of PsA at this time  At 4/2024 visit, Gout was ruled out, has no real risk factors and imaging did not show chondrocalcinosis and US MaxFLEX view did not show evidence of such as well ( also he is 59 wo any other know metabolic risk factors for CPPD)  11/2024 R knee aspirate showed CPPD crystals, with negative secondary CPPD work-up negative  Hep B/C negative status  Quantiferon not needed at this time    Bilateral suprapatellar joint aspiration performed today under ultrasound, with over 30 ml of serous yellow fluid obtained bilaterally. L knee fluid sent for analysis  40 mg of Kenalog also injected bilaterally, patient tolerated procedure well  Given patient continues to have recurrence of inflammatory arthritis flares while on HCQ monotherapy, will discontinue at this time and start methotrexate (15 mg weekly) with daily folic acid  Noted heme/onc concern regarding immunosuppression and increased risk of melanoma relapse/occurrence of other skin cancers- however, benefits outweigh risks at this time to use immunosuppression, as untreated inflammatory arthritis can contribute to joint damage. Repeated knee aspirations with steroid injections unfortunately canot be used as long term treatment for inflammatory arthritis    HM  Patient was counseled to update pneumococcal vaccine, already has received Shingles vaccine    RTC 3 months and sooner PRN    Patient seen and discussed with Dr. Babcock.    Christiana Cobb MD  Rheumatology Fellow, PGY-4    Orders Placed This Encounter   Procedures   • Large Joint Injection/Arthrocentesis   • Sterile Fluid Culture/Smear   • Point of Care Ultrasound   • Body Fluid Cell Count With Differential   • Crystal Identification and Pathologist Review Synovial  Fluid

## 2025-05-13 LAB
APPEARANCE SNV: ABNORMAL
BASOPHILS NFR SNV MANUAL: 0 %
COLOR SNV: ABNORMAL
EOSINOPHIL NFR SNV MANUAL: 0 % (ref 0–2)
LYMPHOCYTES NFR SNV MANUAL: 24 % (ref 0–74)
MONOS+MACROS NFR SNV MANUAL: 15 % (ref 0–69)
NEUTROPHILS NFR SNV MANUAL: 60 % (ref 0–24)
SPECIMEN SOURCE: ABNORMAL
SYNOVIOCYTES NFR SNV: 1 % (ref 0–15)
WBC # SNV MANUAL: 5556 CELLS/UL

## 2025-05-13 NOTE — PROGRESS NOTES
Patient ID: Errol Das is a 60 y.o. male.      Performed by: Weston Babcock MD  Authorized by: Weston Babcock MD      Procedure: MSK Ultrasound    Views:          Findings:  Joint: Abnormal joint fluid was identified.          Impression:  MSK: The focused MSK ultrasound exam was POSITIVE for abnormalities as specified.      Comments: Large effusion seen in supra patellar recess Right knee along with peripatellar gutters  Moderate effusion in supra patellar recess of left knee   - images saved on PACS  Ultrasound was used for needle visualization for arthrocentesis and CSI in bilteral knees  Patient tolerated the procedure well  Details as per Dr Hodges documentation

## 2025-05-15 LAB
BACTERIA FLD CULT: NORMAL
CRYSTALS SNV MICRO: NORMAL
GRAM STN SPEC: NORMAL
GRAM STN SPEC: NORMAL
SPECIMEN SOURCE: NORMAL

## 2025-06-09 ENCOUNTER — APPOINTMENT (OUTPATIENT)
Dept: DERMATOLOGY | Facility: CLINIC | Age: 60
End: 2025-06-09
Payer: COMMERCIAL

## 2025-07-23 ENCOUNTER — HOSPITAL ENCOUNTER (OUTPATIENT)
Dept: GASTROENTEROLOGY | Facility: HOSPITAL | Age: 60
Discharge: HOME | End: 2025-07-23
Payer: COMMERCIAL

## 2025-07-23 ENCOUNTER — ANESTHESIA (OUTPATIENT)
Dept: GASTROENTEROLOGY | Facility: HOSPITAL | Age: 60
End: 2025-07-23
Payer: COMMERCIAL

## 2025-07-23 ENCOUNTER — ANESTHESIA EVENT (OUTPATIENT)
Dept: GASTROENTEROLOGY | Facility: HOSPITAL | Age: 60
End: 2025-07-23
Payer: COMMERCIAL

## 2025-07-23 VITALS
HEART RATE: 46 BPM | SYSTOLIC BLOOD PRESSURE: 101 MMHG | TEMPERATURE: 97.8 F | BODY MASS INDEX: 24.25 KG/M2 | RESPIRATION RATE: 20 BRPM | HEIGHT: 68 IN | OXYGEN SATURATION: 97 % | DIASTOLIC BLOOD PRESSURE: 76 MMHG | WEIGHT: 160 LBS

## 2025-07-23 DIAGNOSIS — Z12.11 ENCOUNTER FOR SCREENING FOR MALIGNANT NEOPLASM OF COLON: ICD-10-CM

## 2025-07-23 PROCEDURE — 45385 COLONOSCOPY W/LESION REMOVAL: CPT | Performed by: STUDENT IN AN ORGANIZED HEALTH CARE EDUCATION/TRAINING PROGRAM

## 2025-07-23 PROCEDURE — 7100000010 HC PHASE TWO TIME - EACH INCREMENTAL 1 MINUTE

## 2025-07-23 PROCEDURE — 7100000009 HC PHASE TWO TIME - INITIAL BASE CHARGE

## 2025-07-23 PROCEDURE — 3700000002 HC GENERAL ANESTHESIA TIME - EACH INCREMENTAL 1 MINUTE

## 2025-07-23 PROCEDURE — 2500000004 HC RX 250 GENERAL PHARMACY W/ HCPCS (ALT 636 FOR OP/ED)

## 2025-07-23 PROCEDURE — A45385 PR COLONOSCOPY,REMV LESN,SNARE: Performed by: ANESTHESIOLOGY

## 2025-07-23 PROCEDURE — A45385 PR COLONOSCOPY,REMV LESN,SNARE

## 2025-07-23 PROCEDURE — 3700000001 HC GENERAL ANESTHESIA TIME - INITIAL BASE CHARGE

## 2025-07-23 RX ORDER — ALBUTEROL SULFATE 0.83 MG/ML
2.5 SOLUTION RESPIRATORY (INHALATION) ONCE AS NEEDED
Status: DISCONTINUED | OUTPATIENT
Start: 2025-07-23 | End: 2025-07-24 | Stop reason: HOSPADM

## 2025-07-23 RX ORDER — OXYCODONE HYDROCHLORIDE 5 MG/1
5 TABLET ORAL EVERY 4 HOURS PRN
Status: DISCONTINUED | OUTPATIENT
Start: 2025-07-23 | End: 2025-07-24 | Stop reason: HOSPADM

## 2025-07-23 RX ORDER — LIDOCAINE IN NACL,ISO-OSMOT/PF 30 MG/3 ML
0.1 SYRINGE (ML) INJECTION ONCE
Status: DISCONTINUED | OUTPATIENT
Start: 2025-07-23 | End: 2025-07-24 | Stop reason: HOSPADM

## 2025-07-23 RX ORDER — PROPOFOL 10 MG/ML
INJECTION, EMULSION INTRAVENOUS AS NEEDED
Status: DISCONTINUED | OUTPATIENT
Start: 2025-07-23 | End: 2025-07-23

## 2025-07-23 RX ORDER — METHOCARBAMOL 100 MG/ML
1000 INJECTION, SOLUTION INTRAMUSCULAR; INTRAVENOUS ONCE
Status: DISCONTINUED | OUTPATIENT
Start: 2025-07-23 | End: 2025-07-24 | Stop reason: HOSPADM

## 2025-07-23 RX ORDER — SODIUM CHLORIDE, SODIUM LACTATE, POTASSIUM CHLORIDE, CALCIUM CHLORIDE 600; 310; 30; 20 MG/100ML; MG/100ML; MG/100ML; MG/100ML
50 INJECTION, SOLUTION INTRAVENOUS CONTINUOUS
Status: SHIPPED | OUTPATIENT
Start: 2025-07-23 | End: 2025-07-23

## 2025-07-23 RX ORDER — ACETAMINOPHEN 325 MG/1
650 TABLET ORAL EVERY 4 HOURS PRN
Status: DISCONTINUED | OUTPATIENT
Start: 2025-07-23 | End: 2025-07-24 | Stop reason: HOSPADM

## 2025-07-23 RX ORDER — HYDROMORPHONE HYDROCHLORIDE 1 MG/ML
0.2 INJECTION, SOLUTION INTRAMUSCULAR; INTRAVENOUS; SUBCUTANEOUS EVERY 5 MIN PRN
Status: DISCONTINUED | OUTPATIENT
Start: 2025-07-23 | End: 2025-07-24 | Stop reason: HOSPADM

## 2025-07-23 RX ORDER — MIDAZOLAM HYDROCHLORIDE 2 MG/2ML
0.5 INJECTION, SOLUTION INTRAMUSCULAR; INTRAVENOUS
Status: DISCONTINUED | OUTPATIENT
Start: 2025-07-23 | End: 2025-07-24 | Stop reason: HOSPADM

## 2025-07-23 RX ORDER — LABETALOL HYDROCHLORIDE 5 MG/ML
5 INJECTION, SOLUTION INTRAVENOUS ONCE AS NEEDED
Status: DISCONTINUED | OUTPATIENT
Start: 2025-07-23 | End: 2025-07-24 | Stop reason: HOSPADM

## 2025-07-23 RX ORDER — HYDRALAZINE HYDROCHLORIDE 20 MG/ML
5 INJECTION INTRAMUSCULAR; INTRAVENOUS EVERY 30 MIN PRN
Status: DISCONTINUED | OUTPATIENT
Start: 2025-07-23 | End: 2025-07-24 | Stop reason: HOSPADM

## 2025-07-23 RX ORDER — DROPERIDOL 2.5 MG/ML
0.62 INJECTION, SOLUTION INTRAMUSCULAR; INTRAVENOUS ONCE AS NEEDED
Status: DISCONTINUED | OUTPATIENT
Start: 2025-07-23 | End: 2025-07-24 | Stop reason: HOSPADM

## 2025-07-23 RX ORDER — ONDANSETRON HYDROCHLORIDE 2 MG/ML
4 INJECTION, SOLUTION INTRAVENOUS ONCE AS NEEDED
Status: DISCONTINUED | OUTPATIENT
Start: 2025-07-23 | End: 2025-07-24 | Stop reason: HOSPADM

## 2025-07-23 RX ORDER — LIDOCAINE HCL/PF 100 MG/5ML
SYRINGE (ML) INTRAVENOUS AS NEEDED
Status: DISCONTINUED | OUTPATIENT
Start: 2025-07-23 | End: 2025-07-23

## 2025-07-23 RX ORDER — DIPHENHYDRAMINE HYDROCHLORIDE 50 MG/ML
12.5 INJECTION, SOLUTION INTRAMUSCULAR; INTRAVENOUS ONCE AS NEEDED
Status: DISCONTINUED | OUTPATIENT
Start: 2025-07-23 | End: 2025-07-24 | Stop reason: HOSPADM

## 2025-07-23 RX ORDER — MIDAZOLAM HYDROCHLORIDE 2 MG/2ML
INJECTION, SOLUTION INTRAMUSCULAR; INTRAVENOUS AS NEEDED
Status: DISCONTINUED | OUTPATIENT
Start: 2025-07-23 | End: 2025-07-23

## 2025-07-23 RX ORDER — HYDROMORPHONE HYDROCHLORIDE 1 MG/ML
0.4 INJECTION, SOLUTION INTRAMUSCULAR; INTRAVENOUS; SUBCUTANEOUS EVERY 5 MIN PRN
Status: DISCONTINUED | OUTPATIENT
Start: 2025-07-23 | End: 2025-07-24 | Stop reason: HOSPADM

## 2025-07-23 RX ADMIN — MIDAZOLAM HYDROCHLORIDE 2 MG: 2 INJECTION, SOLUTION INTRAMUSCULAR; INTRAVENOUS at 09:14

## 2025-07-23 RX ADMIN — PROPOFOL 20 MG: 10 INJECTION, EMULSION INTRAVENOUS at 09:20

## 2025-07-23 RX ADMIN — PROPOFOL 150 MCG/KG/MIN: 10 INJECTION, EMULSION INTRAVENOUS at 09:15

## 2025-07-23 RX ADMIN — PROPOFOL 30 MG: 10 INJECTION, EMULSION INTRAVENOUS at 09:14

## 2025-07-23 RX ADMIN — LIDOCAINE HYDROCHLORIDE 60 MG: 20 INJECTION INTRAVENOUS at 09:14

## 2025-07-23 RX ADMIN — PROPOFOL 30 MG: 10 INJECTION, EMULSION INTRAVENOUS at 09:16

## 2025-07-23 RX ADMIN — PROPOFOL 20 MG: 10 INJECTION, EMULSION INTRAVENOUS at 09:27

## 2025-07-23 RX ADMIN — SODIUM CHLORIDE, SODIUM LACTATE, POTASSIUM CHLORIDE, AND CALCIUM CHLORIDE: 600; 310; 30; 20 INJECTION, SOLUTION INTRAVENOUS at 09:11

## 2025-07-23 ASSESSMENT — PAIN SCALES - GENERAL
PAINLEVEL_OUTOF10: 0 - NO PAIN
PAIN_LEVEL: 0
PAINLEVEL_OUTOF10: 0 - NO PAIN

## 2025-07-23 ASSESSMENT — PAIN - FUNCTIONAL ASSESSMENT
PAIN_FUNCTIONAL_ASSESSMENT: UNABLE TO SELF-REPORT
PAIN_FUNCTIONAL_ASSESSMENT: 0-10

## 2025-07-23 ASSESSMENT — COLUMBIA-SUICIDE SEVERITY RATING SCALE - C-SSRS
1. IN THE PAST MONTH, HAVE YOU WISHED YOU WERE DEAD OR WISHED YOU COULD GO TO SLEEP AND NOT WAKE UP?: NO
6. HAVE YOU EVER DONE ANYTHING, STARTED TO DO ANYTHING, OR PREPARED TO DO ANYTHING TO END YOUR LIFE?: NO
2. HAVE YOU ACTUALLY HAD ANY THOUGHTS OF KILLING YOURSELF?: NO

## 2025-07-23 NOTE — ANESTHESIA PREPROCEDURE EVALUATION
Patient: Errol Das    Procedure Information       Date/Time: 07/23/25 0920    Scheduled providers: Hanna Adams MD; Debra Martinez RN    Procedure: COLONOSCOPY    [facility]            Relevant Problems   Endocrine   (+) Hypothyroid      Skin   (+) Melanoma of skin (Multi)       Clinical information reviewed:   Tobacco  Allergies  Meds   Med Hx  Surg Hx   Fam Hx  Soc Hx        NPO Detail:  NPO/Void Status  Carbohydrate Drink Given Prior to Surgery? : N  Date of Last Liquid: 07/23/25  Time of Last Liquid: 0600  Date of Last Solid: 07/22/25  Time of Last Solid: 0800  Last Intake Type: Clear fluids  Time of Last Void: 0835         Physical Exam    Airway  Mallampati: II  TM distance: >3 FB  Neck ROM: limited  Comments: Can bite upper lip  A little decreased neck motion     Cardiovascular   Rhythm: regular     Dental    Pulmonary    Abdominal            Anesthesia Plan    History of general anesthesia?: yes  History of complications of general anesthesia?: no    ASA 3     MAC     intravenous induction   Anesthetic plan and risks discussed with patient.    Plan discussed with CAA and attending.

## 2025-07-23 NOTE — H&P
Subjective     History of Present Illness:   Errol Das is a 60 y.o. male who presents to endoscopy for surveillance colonoscopy. He has had one prior colonoscopy in 2019 where an 8mm polyp was removed, path showing TA. No known family history of colon cancer. He otherwise has recently been asymptomatic from a GI standpoint, denies diarrhea, constipation, abdominal pain, blood in the stool.     Review of Systems  Constitutional: denies in acute distress  Cardiovascular: denies chest pain  Respiratory: denies shortness of breath  GI: see HPI  Neurologic: denies altered mental status  Dermatology: denies jaundice    Past Medical History   has a past medical history of Insomnia, Malignant melanoma of skin of neck (Multi), Melanoma metastatic to lymph node (Multi), and Seronegative arthritis.     Social History   reports that he has never smoked. He has never been exposed to tobacco smoke. He has never used smokeless tobacco. He reports current alcohol use of about 7.0 standard drinks of alcohol per week. He reports that he does not use drugs.     Family History  family history includes Alcohol abuse in his paternal grandfather; Cancer in his father and maternal grandmother; Melanoma in his father; No Known Problems in his mother and sister.     Allergies  RX Allergies[1]    Medications  Current Outpatient Medications   Medication Instructions    folic acid (FOLVITE) 1 mg, oral, Daily    levothyroxine (SYNTHROID, LEVOXYL) 112 mcg, oral, Daily before breakfast    methotrexate (TREXALL) 15 mg, oral, Weekly, Follow directions carefully, and ask to explain any part you do not understand. Take exactly as directed.    zolpidem (AMBIEN) 10 mg, oral, Nightly PRN        Objective   Visit Vitals  /79   Pulse 66   Temp 36.2 °C (97.1 °F) (Temporal)   Resp 16        Physical Exam  General: not in acute distress  CV: regular rate and rhythm  Resp: non-labored breathing  GI: soft, active bowel sounds, non-tender to  palpation, no rebound or guarding  Msk: moving all extremities   Derm: no jaundice    Labs  Lab Results   Component Value Date    WBC 6.2 04/08/2025    HGB 13.6 04/08/2025    HCT 41.3 04/08/2025    MCV 94 04/08/2025     04/08/2025     Lab Results   Component Value Date    GLUCOSE 93 04/08/2025    CALCIUM 9.9 04/08/2025     04/08/2025    K 4.0 04/08/2025    CO2 25 04/08/2025     04/08/2025    BUN 14 04/08/2025    CREATININE 0.79 04/08/2025     Lab Results   Component Value Date    ALT 12 04/08/2025    AST 19 04/08/2025    ALKPHOS 51 04/08/2025    BILITOT 0.6 04/08/2025     Procedures:   Colonoscopy 2019:   Impression:       - The examined portion of the ileum was normal.                         - One 8 mm polyp in the sigmoid colon, removed with a                          cold snare. Resected and retrieved.                         - The examination was otherwise normal on direct and                          retroflexion views.    Path:   FINAL DIAGNOSIS   A.  SIGMOID - POLYP- COLD SNARE:    -- TUBULAR ADENOMA.     Assessment/Plan   Errol Das is a 60 y.o. male who presents to endoscopy for surveillance colonoscopy. One prior colonoscopy in 2019 with an 8mm TA removed from the sigmoid.  Stable to proceed with planned procedure.        Sonia Weaver MD  Gastroenterology Fellow, PGY-4             [1] No Known Allergies

## 2025-07-23 NOTE — SIGNIFICANT EVENT
Dr. Schmitt was made aware of patient vitals. After reviewing patient chart and coming to bedside to assess patient. She stated that he is fine and is able to be discharge.

## 2025-07-23 NOTE — ANESTHESIA POSTPROCEDURE EVALUATION
Patient: Errol Das    Procedure Summary       Date: 07/23/25 Room / Location: [facility]    Anesthesia Start: 0911 Anesthesia Stop: 0959    Procedure: COLONOSCOPY Diagnosis: History of colon polyps    Scheduled Providers: Hanna Adams MD; Debra Martinez RN Responsible Provider: Leona Schmitt MD    Anesthesia Type: MAC ASA Status: 3            Anesthesia Type: MAC    Vitals Value Taken Time   /72 07/23/25 09:57   Temp 36.6 °C (97.8 °F) 07/23/25 09:57   Pulse 64 07/23/25 09:57   Resp 17 07/23/25 09:57   SpO2 95 % 07/23/25 09:57       Anesthesia Post Evaluation    Patient location during evaluation: PACU  Patient participation: complete - patient cannot participate  Level of consciousness: sleepy but conscious  Pain score: 0  Pain management: adequate  Airway patency: patent  Cardiovascular status: acceptable  Respiratory status: acceptable  Hydration status: acceptable  Postoperative Nausea and Vomiting: none        There were no known notable events for this encounter.

## 2025-07-24 ASSESSMENT — PAIN SCALES - GENERAL: PAINLEVEL_OUTOF10: 0 - NO PAIN

## 2025-07-31 LAB
LABORATORY COMMENT REPORT: NORMAL
PATH REPORT.FINAL DX SPEC: NORMAL
PATH REPORT.GROSS SPEC: NORMAL
PATH REPORT.TOTAL CANCER: NORMAL

## 2025-08-18 ENCOUNTER — APPOINTMENT (OUTPATIENT)
Dept: RHEUMATOLOGY | Facility: CLINIC | Age: 60
End: 2025-08-18
Payer: COMMERCIAL

## 2025-08-18 VITALS
HEART RATE: 58 BPM | BODY MASS INDEX: 25.01 KG/M2 | SYSTOLIC BLOOD PRESSURE: 110 MMHG | HEIGHT: 68 IN | DIASTOLIC BLOOD PRESSURE: 73 MMHG | WEIGHT: 165 LBS

## 2025-08-18 DIAGNOSIS — Z92.89 HISTORY OF IMMUNOTHERAPY: ICD-10-CM

## 2025-08-18 DIAGNOSIS — G89.29 CHRONIC PAIN OF RIGHT KNEE: ICD-10-CM

## 2025-08-18 DIAGNOSIS — M19.90 INFLAMMATORY ARTHRITIS: Primary | ICD-10-CM

## 2025-08-18 DIAGNOSIS — M13.80 SERONEGATIVE ARTHRITIS: ICD-10-CM

## 2025-08-18 DIAGNOSIS — D84.9 IMMUNOSUPPRESSION: ICD-10-CM

## 2025-08-18 DIAGNOSIS — M25.561 CHRONIC PAIN OF RIGHT KNEE: ICD-10-CM

## 2025-08-18 DIAGNOSIS — Z79.899 HIGH RISK MEDICATION USE: ICD-10-CM

## 2025-08-18 PROCEDURE — 3008F BODY MASS INDEX DOCD: CPT | Performed by: STUDENT IN AN ORGANIZED HEALTH CARE EDUCATION/TRAINING PROGRAM

## 2025-08-18 PROCEDURE — 1036F TOBACCO NON-USER: CPT | Performed by: STUDENT IN AN ORGANIZED HEALTH CARE EDUCATION/TRAINING PROGRAM

## 2025-08-18 PROCEDURE — 99215 OFFICE O/P EST HI 40 MIN: CPT | Performed by: STUDENT IN AN ORGANIZED HEALTH CARE EDUCATION/TRAINING PROGRAM

## 2025-08-18 RX ORDER — METHOTREXATE 2.5 MG/1
15 TABLET ORAL WEEKLY
COMMUNITY
Start: 2025-08-02 | End: 2025-08-18 | Stop reason: SDUPTHER

## 2025-08-18 RX ORDER — METHOTREXATE 2.5 MG/1
15 TABLET ORAL WEEKLY
Qty: 72 TABLET | Refills: 1 | Status: SHIPPED | OUTPATIENT
Start: 2025-08-18

## 2025-08-18 ASSESSMENT — PATIENT HEALTH QUESTIONNAIRE - PHQ9
2. FEELING DOWN, DEPRESSED OR HOPELESS: NOT AT ALL
1. LITTLE INTEREST OR PLEASURE IN DOING THINGS: NOT AT ALL
SUM OF ALL RESPONSES TO PHQ9 QUESTIONS 1 & 2: 0

## 2025-08-18 ASSESSMENT — PAIN SCALES - GENERAL: PAINLEVEL_OUTOF10: 0-NO PAIN

## 2025-08-19 LAB
ALBUMIN SERPL-MCNC: 4.7 G/DL (ref 3.6–5.1)
ALP SERPL-CCNC: 58 U/L (ref 35–144)
ALT SERPL-CCNC: 11 U/L (ref 9–46)
ANION GAP SERPL CALCULATED.4IONS-SCNC: 8 MMOL/L (CALC) (ref 7–17)
AST SERPL-CCNC: 17 U/L (ref 10–35)
BASOPHILS # BLD AUTO: 52 CELLS/UL (ref 0–200)
BASOPHILS NFR BLD AUTO: 1 %
BILIRUB SERPL-MCNC: 0.5 MG/DL (ref 0.2–1.2)
BUN SERPL-MCNC: 14 MG/DL (ref 7–25)
CALCIUM SERPL-MCNC: 9.7 MG/DL (ref 8.6–10.3)
CHLORIDE SERPL-SCNC: 101 MMOL/L (ref 98–110)
CO2 SERPL-SCNC: 28 MMOL/L (ref 20–32)
CREAT SERPL-MCNC: 0.79 MG/DL (ref 0.7–1.3)
CRP SERPL-MCNC: <3 MG/L
EGFRCR SERPLBLD CKD-EPI 2021: 103 ML/MIN/1.73M2
EOSINOPHIL # BLD AUTO: 218 CELLS/UL (ref 15–500)
EOSINOPHIL NFR BLD AUTO: 4.2 %
ERYTHROCYTE [DISTWIDTH] IN BLOOD BY AUTOMATED COUNT: 14.3 % (ref 11–15)
ERYTHROCYTE [SEDIMENTATION RATE] IN BLOOD BY WESTERGREN METHOD: 6 MM/H
GLUCOSE SERPL-MCNC: 92 MG/DL (ref 65–99)
HCT VFR BLD AUTO: 40.5 % (ref 38.5–50)
HGB BLD-MCNC: 13 G/DL (ref 13.2–17.1)
LYMPHOCYTES # BLD AUTO: 1232 CELLS/UL (ref 850–3900)
LYMPHOCYTES NFR BLD AUTO: 23.7 %
MCH RBC QN AUTO: 31.1 PG (ref 27–33)
MCHC RBC AUTO-ENTMCNC: 32.1 G/DL (ref 32–36)
MCV RBC AUTO: 96.9 FL (ref 80–100)
MONOCYTES # BLD AUTO: 426 CELLS/UL (ref 200–950)
MONOCYTES NFR BLD AUTO: 8.2 %
NEUTROPHILS # BLD AUTO: 3271 CELLS/UL (ref 1500–7800)
NEUTROPHILS NFR BLD AUTO: 62.9 %
PLATELET # BLD AUTO: 242 THOUSAND/UL (ref 140–400)
PMV BLD REES-ECKER: 10.5 FL (ref 7.5–12.5)
POTASSIUM SERPL-SCNC: 4.4 MMOL/L (ref 3.5–5.3)
PROT SERPL-MCNC: 7 G/DL (ref 6.1–8.1)
RBC # BLD AUTO: 4.18 MILLION/UL (ref 4.2–5.8)
SODIUM SERPL-SCNC: 137 MMOL/L (ref 135–146)
WBC # BLD AUTO: 5.2 THOUSAND/UL (ref 3.8–10.8)

## 2025-09-02 ASSESSMENT — DERMATOLOGY QUALITY OF LIFE (QOL) ASSESSMENT
RATE HOW EMOTIONALLY BOTHERED YOU ARE BY YOUR SKIN PROBLEM (FOR EXAMPLE, WORRY, EMBARRASSMENT, FRUSTRATION): 0 - NEVER BOTHERED
DATE THE QUALITY-OF-LIFE ASSESSMENT WAS COMPLETED: 67450
RATE HOW BOTHERED YOU ARE BY SYMPTOMS OF YOUR SKIN PROBLEM (EG, ITCHING, STINGING BURNING, HURTING OR SKIN IRRITATION): 0 - NEVER BOTHERED
RATE HOW BOTHERED YOU ARE BY EFFECTS OF YOUR SKIN PROBLEMS ON YOUR ACTIVITIES (EG, GOING OUT, ACCOMPLISHING WHAT YOU WANT, WORK ACTIVITIES OR YOUR RELATIONSHIPS WITH OTHERS): 0 - NEVER BOTHERED
RATE HOW BOTHERED YOU ARE BY SYMPTOMS OF YOUR SKIN PROBLEM (EG, ITCHING, STINGING BURNING, HURTING OR SKIN IRRITATION): 0 - NEVER BOTHERED
RATE HOW EMOTIONALLY BOTHERED YOU ARE BY YOUR SKIN PROBLEM (FOR EXAMPLE, WORRY, EMBARRASSMENT, FRUSTRATION): 0 - NEVER BOTHERED
WHAT SINGLE SKIN CONDITION LISTED BELOW IS THE PATIENT ANSWERING THE QUALITY-OF-LIFE ASSESSMENT QUESTIONS ABOUT: NONE OF THE ABOVE
RATE HOW BOTHERED YOU ARE BY EFFECTS OF YOUR SKIN PROBLEMS ON YOUR ACTIVITIES (EG, GOING OUT, ACCOMPLISHING WHAT YOU WANT, WORK ACTIVITIES OR YOUR RELATIONSHIPS WITH OTHERS): 0 - NEVER BOTHERED
WHAT SINGLE SKIN CONDITION LISTED BELOW IS THE PATIENT ANSWERING THE QUALITY-OF-LIFE ASSESSMENT QUESTIONS ABOUT: NONE OF THE ABOVE

## 2025-09-02 ASSESSMENT — PATIENT GLOBAL ASSESSMENT (PGA): WHAT IS THE PGA: PATIENT GLOBAL ASSESSMENT:  1 - CLEAR

## 2025-09-05 ENCOUNTER — OFFICE VISIT (OUTPATIENT)
Dept: DERMATOLOGY | Facility: CLINIC | Age: 60
End: 2025-09-05
Payer: COMMERCIAL

## 2025-09-05 DIAGNOSIS — L90.5 SCAR CONDITIONS AND FIBROSIS OF SKIN: ICD-10-CM

## 2025-09-05 DIAGNOSIS — D22.9 MULTIPLE BENIGN MELANOCYTIC NEVI: ICD-10-CM

## 2025-09-05 DIAGNOSIS — L57.0 ACTINIC KERATOSIS: Primary | ICD-10-CM

## 2025-09-05 DIAGNOSIS — L81.4 LENTIGO: ICD-10-CM

## 2025-09-05 DIAGNOSIS — L57.8 SUN-DAMAGED SKIN: ICD-10-CM

## 2025-09-05 DIAGNOSIS — L82.1 SEBORRHEIC KERATOSES: ICD-10-CM

## 2025-09-05 DIAGNOSIS — Z12.83 SKIN EXAM, SCREENING FOR CANCER: ICD-10-CM

## 2025-09-05 DIAGNOSIS — Z85.828 HISTORY OF NONMELANOMA SKIN CANCER: ICD-10-CM

## 2025-09-05 DIAGNOSIS — Z85.820 PERSONAL HISTORY OF MALIGNANT MELANOMA: ICD-10-CM

## 2025-09-05 DIAGNOSIS — D18.01 CHERRY ANGIOMA: ICD-10-CM

## 2025-11-03 ENCOUNTER — APPOINTMENT (OUTPATIENT)
Dept: DERMATOLOGY | Facility: CLINIC | Age: 60
End: 2025-11-03
Payer: COMMERCIAL

## 2025-12-01 ENCOUNTER — APPOINTMENT (OUTPATIENT)
Dept: RHEUMATOLOGY | Facility: CLINIC | Age: 60
End: 2025-12-01
Payer: COMMERCIAL

## 2026-02-05 ENCOUNTER — APPOINTMENT (OUTPATIENT)
Dept: OPHTHALMOLOGY | Facility: CLINIC | Age: 61
End: 2026-02-05
Payer: COMMERCIAL

## 2026-03-09 ENCOUNTER — APPOINTMENT (OUTPATIENT)
Dept: DERMATOLOGY | Facility: CLINIC | Age: 61
End: 2026-03-09
Payer: COMMERCIAL